# Patient Record
Sex: FEMALE | Race: WHITE | Employment: OTHER | URBAN - NONMETROPOLITAN AREA
[De-identification: names, ages, dates, MRNs, and addresses within clinical notes are randomized per-mention and may not be internally consistent; named-entity substitution may affect disease eponyms.]

---

## 2019-05-02 ENCOUNTER — APPOINTMENT (OUTPATIENT)
Dept: GENERAL RADIOLOGY | Age: 84
DRG: 511 | End: 2019-05-02
Payer: MEDICARE

## 2019-05-02 ENCOUNTER — HOSPITAL ENCOUNTER (INPATIENT)
Age: 84
LOS: 2 days | Discharge: HOME | DRG: 511 | End: 2019-05-04
Attending: EMERGENCY MEDICINE | Admitting: FAMILY MEDICINE
Payer: MEDICARE

## 2019-05-02 ENCOUNTER — APPOINTMENT (OUTPATIENT)
Dept: CT IMAGING | Age: 84
DRG: 511 | End: 2019-05-02
Payer: MEDICARE

## 2019-05-02 DIAGNOSIS — S62.521A CLOSED DISPLACED FRACTURE OF DISTAL PHALANX OF RIGHT THUMB, INITIAL ENCOUNTER: ICD-10-CM

## 2019-05-02 DIAGNOSIS — S02.2XXA CLOSED FRACTURE OF NASAL BONE, INITIAL ENCOUNTER: ICD-10-CM

## 2019-05-02 DIAGNOSIS — S52.501A CLOSED FRACTURE OF DISTAL END OF RIGHT RADIUS, UNSPECIFIED FRACTURE MORPHOLOGY, INITIAL ENCOUNTER: Primary | ICD-10-CM

## 2019-05-02 DIAGNOSIS — S62.515A CLOSED NONDISPLACED FRACTURE OF PROXIMAL PHALANX OF LEFT THUMB, INITIAL ENCOUNTER: ICD-10-CM

## 2019-05-02 DIAGNOSIS — S02.40DA CLOSED FRACTURE OF LEFT SIDE OF MAXILLA, INITIAL ENCOUNTER (HCC): ICD-10-CM

## 2019-05-02 PROBLEM — S62.91XA RIGHT HAND FRACTURE, CLOSED, INITIAL ENCOUNTER: Status: ACTIVE | Noted: 2019-05-02

## 2019-05-02 LAB
ALBUMIN SERPL-MCNC: 4.2 G/DL (ref 3.5–5.2)
ALP BLD-CCNC: 68 U/L (ref 35–104)
ALT SERPL-CCNC: 20 U/L (ref 5–33)
ANION GAP SERPL CALCULATED.3IONS-SCNC: 11 MMOL/L (ref 7–19)
APTT: 24.7 SEC (ref 26–36.2)
AST SERPL-CCNC: 21 U/L (ref 5–32)
BASOPHILS ABSOLUTE: 0.1 K/UL (ref 0–0.2)
BASOPHILS RELATIVE PERCENT: 1 % (ref 0–1)
BILIRUB SERPL-MCNC: <0.2 MG/DL (ref 0.2–1.2)
BUN BLDV-MCNC: 36 MG/DL (ref 8–23)
CALCIUM SERPL-MCNC: 9.6 MG/DL (ref 8.8–10.2)
CHLORIDE BLD-SCNC: 95 MMOL/L (ref 98–111)
CO2: 26 MMOL/L (ref 22–29)
CREAT SERPL-MCNC: 1 MG/DL (ref 0.5–0.9)
EOSINOPHILS ABSOLUTE: 0.1 K/UL (ref 0–0.6)
EOSINOPHILS RELATIVE PERCENT: 1.7 % (ref 0–5)
GFR NON-AFRICAN AMERICAN: 52
GLUCOSE BLD-MCNC: 99 MG/DL (ref 74–109)
HCT VFR BLD CALC: 32.2 % (ref 37–47)
HEMOGLOBIN: 10.7 G/DL (ref 12–16)
INR BLD: 1.08 (ref 0.88–1.18)
LYMPHOCYTES ABSOLUTE: 1.4 K/UL (ref 1.1–4.5)
LYMPHOCYTES RELATIVE PERCENT: 17.6 % (ref 20–40)
MCH RBC QN AUTO: 30.7 PG (ref 27–31)
MCHC RBC AUTO-ENTMCNC: 33.2 G/DL (ref 33–37)
MCV RBC AUTO: 92.5 FL (ref 81–99)
MONOCYTES ABSOLUTE: 0.8 K/UL (ref 0–0.9)
MONOCYTES RELATIVE PERCENT: 10.1 % (ref 0–10)
NEUTROPHILS ABSOLUTE: 5.6 K/UL (ref 1.5–7.5)
NEUTROPHILS RELATIVE PERCENT: 69.4 % (ref 50–65)
PDW BLD-RTO: 13.2 % (ref 11.5–14.5)
PLATELET # BLD: 246 K/UL (ref 130–400)
PMV BLD AUTO: 11.2 FL (ref 9.4–12.3)
POTASSIUM REFLEX MAGNESIUM: 4 MMOL/L (ref 3.5–5)
PROTHROMBIN TIME: 13.4 SEC (ref 12–14.6)
RBC # BLD: 3.48 M/UL (ref 4.2–5.4)
SODIUM BLD-SCNC: 132 MMOL/L (ref 136–145)
TOTAL PROTEIN: 8.1 G/DL (ref 6.6–8.7)
WBC # BLD: 8.1 K/UL (ref 4.8–10.8)

## 2019-05-02 PROCEDURE — 2W3HX1Z IMMOBILIZATION OF LEFT THUMB USING SPLINT: ICD-10-PCS | Performed by: ORTHOPAEDIC SURGERY

## 2019-05-02 PROCEDURE — 36415 COLL VENOUS BLD VENIPUNCTURE: CPT

## 2019-05-02 PROCEDURE — 99222 1ST HOSP IP/OBS MODERATE 55: CPT | Performed by: HOSPITALIST

## 2019-05-02 PROCEDURE — 72125 CT NECK SPINE W/O DYE: CPT

## 2019-05-02 PROCEDURE — 73110 X-RAY EXAM OF WRIST: CPT

## 2019-05-02 PROCEDURE — 85730 THROMBOPLASTIN TIME PARTIAL: CPT

## 2019-05-02 PROCEDURE — 71045 X-RAY EXAM CHEST 1 VIEW: CPT

## 2019-05-02 PROCEDURE — 73521 X-RAY EXAM HIPS BI 2 VIEWS: CPT

## 2019-05-02 PROCEDURE — 70450 CT HEAD/BRAIN W/O DYE: CPT

## 2019-05-02 PROCEDURE — 99285 EMERGENCY DEPT VISIT HI MDM: CPT | Performed by: EMERGENCY MEDICINE

## 2019-05-02 PROCEDURE — 6360000002 HC RX W HCPCS: Performed by: HOSPITALIST

## 2019-05-02 PROCEDURE — 6360000002 HC RX W HCPCS: Performed by: EMERGENCY MEDICINE

## 2019-05-02 PROCEDURE — 6370000000 HC RX 637 (ALT 250 FOR IP): Performed by: HOSPITALIST

## 2019-05-02 PROCEDURE — 96374 THER/PROPH/DIAG INJ IV PUSH: CPT

## 2019-05-02 PROCEDURE — 2W3CX1Z IMMOBILIZATION OF RIGHT LOWER ARM USING SPLINT: ICD-10-PCS | Performed by: ORTHOPAEDIC SURGERY

## 2019-05-02 PROCEDURE — 73560 X-RAY EXAM OF KNEE 1 OR 2: CPT

## 2019-05-02 PROCEDURE — 73090 X-RAY EXAM OF FOREARM: CPT

## 2019-05-02 PROCEDURE — 73100 X-RAY EXAM OF WRIST: CPT

## 2019-05-02 PROCEDURE — 70486 CT MAXILLOFACIAL W/O DYE: CPT

## 2019-05-02 PROCEDURE — 96376 TX/PRO/DX INJ SAME DRUG ADON: CPT

## 2019-05-02 PROCEDURE — 93005 ELECTROCARDIOGRAM TRACING: CPT

## 2019-05-02 PROCEDURE — 85025 COMPLETE CBC W/AUTO DIFF WBC: CPT

## 2019-05-02 PROCEDURE — 73140 X-RAY EXAM OF FINGER(S): CPT

## 2019-05-02 PROCEDURE — 99285 EMERGENCY DEPT VISIT HI MDM: CPT

## 2019-05-02 PROCEDURE — 6360000002 HC RX W HCPCS: Performed by: NURSE PRACTITIONER

## 2019-05-02 PROCEDURE — 73130 X-RAY EXAM OF HAND: CPT

## 2019-05-02 PROCEDURE — 73590 X-RAY EXAM OF LOWER LEG: CPT

## 2019-05-02 PROCEDURE — 72131 CT LUMBAR SPINE W/O DYE: CPT

## 2019-05-02 PROCEDURE — 85610 PROTHROMBIN TIME: CPT

## 2019-05-02 PROCEDURE — 1210000000 HC MED SURG R&B

## 2019-05-02 PROCEDURE — 2580000003 HC RX 258: Performed by: HOSPITALIST

## 2019-05-02 PROCEDURE — 80053 COMPREHEN METABOLIC PANEL: CPT

## 2019-05-02 RX ORDER — FAMOTIDINE 20 MG/1
20 TABLET, FILM COATED ORAL 2 TIMES DAILY
Status: DISCONTINUED | OUTPATIENT
Start: 2019-05-02 | End: 2019-05-04 | Stop reason: HOSPADM

## 2019-05-02 RX ORDER — ONDANSETRON 2 MG/ML
4 INJECTION INTRAMUSCULAR; INTRAVENOUS EVERY 6 HOURS PRN
Status: DISCONTINUED | OUTPATIENT
Start: 2019-05-02 | End: 2019-05-04 | Stop reason: HOSPADM

## 2019-05-02 RX ORDER — ASPIRIN 81 MG/1
81 TABLET ORAL DAILY
Status: ON HOLD | COMMUNITY
End: 2019-12-19 | Stop reason: HOSPADM

## 2019-05-02 RX ORDER — PREGABALIN 50 MG/1
100 CAPSULE ORAL 2 TIMES DAILY
Status: DISCONTINUED | OUTPATIENT
Start: 2019-05-02 | End: 2019-05-04 | Stop reason: HOSPADM

## 2019-05-02 RX ORDER — PREGABALIN 50 MG/1
50 CAPSULE ORAL 2 TIMES DAILY
COMMUNITY

## 2019-05-02 RX ORDER — MORPHINE SULFATE 4 MG/ML
4 INJECTION, SOLUTION INTRAMUSCULAR; INTRAVENOUS ONCE
Status: COMPLETED | OUTPATIENT
Start: 2019-05-02 | End: 2019-05-02

## 2019-05-02 RX ORDER — POTASSIUM CHLORIDE 7.45 MG/ML
10 INJECTION INTRAVENOUS PRN
Status: DISCONTINUED | OUTPATIENT
Start: 2019-05-02 | End: 2019-05-04 | Stop reason: HOSPADM

## 2019-05-02 RX ORDER — SODIUM CHLORIDE 9 MG/ML
INJECTION, SOLUTION INTRAVENOUS CONTINUOUS
Status: DISCONTINUED | OUTPATIENT
Start: 2019-05-02 | End: 2019-05-04 | Stop reason: HOSPADM

## 2019-05-02 RX ORDER — ACETAMINOPHEN 325 MG/1
650 TABLET ORAL EVERY 4 HOURS PRN
Status: DISCONTINUED | OUTPATIENT
Start: 2019-05-02 | End: 2019-05-04 | Stop reason: HOSPADM

## 2019-05-02 RX ORDER — MORPHINE SULFATE 2 MG/ML
2 INJECTION, SOLUTION INTRAMUSCULAR; INTRAVENOUS EVERY 4 HOURS PRN
Status: DISCONTINUED | OUTPATIENT
Start: 2019-05-02 | End: 2019-05-03

## 2019-05-02 RX ORDER — MAGNESIUM SULFATE 1 G/100ML
1 INJECTION INTRAVENOUS PRN
Status: DISCONTINUED | OUTPATIENT
Start: 2019-05-02 | End: 2019-05-04 | Stop reason: HOSPADM

## 2019-05-02 RX ORDER — HYDRALAZINE HYDROCHLORIDE 25 MG/1
25 TABLET, FILM COATED ORAL 3 TIMES DAILY
Status: ON HOLD | COMMUNITY
End: 2019-12-19 | Stop reason: HOSPADM

## 2019-05-02 RX ORDER — OMEPRAZOLE 40 MG/1
40 CAPSULE, DELAYED RELEASE ORAL DAILY
COMMUNITY

## 2019-05-02 RX ORDER — ONDANSETRON 2 MG/ML
4 INJECTION INTRAMUSCULAR; INTRAVENOUS ONCE
Status: COMPLETED | OUTPATIENT
Start: 2019-05-02 | End: 2019-05-02

## 2019-05-02 RX ORDER — POTASSIUM CHLORIDE 20 MEQ/1
40 TABLET, EXTENDED RELEASE ORAL PRN
Status: DISCONTINUED | OUTPATIENT
Start: 2019-05-02 | End: 2019-05-04 | Stop reason: HOSPADM

## 2019-05-02 RX ORDER — SODIUM CHLORIDE 0.9 % (FLUSH) 0.9 %
10 SYRINGE (ML) INJECTION EVERY 12 HOURS SCHEDULED
Status: DISCONTINUED | OUTPATIENT
Start: 2019-05-02 | End: 2019-05-04 | Stop reason: HOSPADM

## 2019-05-02 RX ORDER — SODIUM CHLORIDE 0.9 % (FLUSH) 0.9 %
10 SYRINGE (ML) INJECTION PRN
Status: DISCONTINUED | OUTPATIENT
Start: 2019-05-02 | End: 2019-05-04 | Stop reason: HOSPADM

## 2019-05-02 RX ORDER — VALSARTAN AND HYDROCHLOROTHIAZIDE 320; 25 MG/1; MG/1
1 TABLET, FILM COATED ORAL DAILY
COMMUNITY

## 2019-05-02 RX ADMIN — MORPHINE SULFATE 4 MG: 4 INJECTION INTRAVENOUS at 19:00

## 2019-05-02 RX ADMIN — MORPHINE SULFATE 4 MG: 4 INJECTION INTRAVENOUS at 17:05

## 2019-05-02 RX ADMIN — SODIUM CHLORIDE: 9 INJECTION, SOLUTION INTRAVENOUS at 23:10

## 2019-05-02 RX ADMIN — ONDANSETRON 4 MG: 2 INJECTION INTRAMUSCULAR; INTRAVENOUS at 17:05

## 2019-05-02 RX ADMIN — MORPHINE SULFATE 2 MG: 2 INJECTION, SOLUTION INTRAMUSCULAR; INTRAVENOUS at 23:10

## 2019-05-02 RX ADMIN — PREGABALIN 100 MG: 50 CAPSULE ORAL at 23:10

## 2019-05-02 ASSESSMENT — ENCOUNTER SYMPTOMS
SHORTNESS OF BREATH: 0
BACK PAIN: 1
WHEEZING: 0
PHOTOPHOBIA: 0
EYE ITCHING: 0
CHEST TIGHTNESS: 0
SORE THROAT: 0
STRIDOR: 0
BLOOD IN STOOL: 0
CONSTIPATION: 0
ABDOMINAL DISTENTION: 0
ALLERGIC/IMMUNOLOGIC NEGATIVE: 1
TROUBLE SWALLOWING: 0
SINUS PAIN: 0
EYE DISCHARGE: 0
VOMITING: 0
DIARRHEA: 0
EYE REDNESS: 0
COLOR CHANGE: 0
EYE PAIN: 0
NAUSEA: 0
ABDOMINAL PAIN: 0

## 2019-05-02 ASSESSMENT — PAIN SCALES - GENERAL
PAINLEVEL_OUTOF10: 0
PAINLEVEL_OUTOF10: 6
PAINLEVEL_OUTOF10: 7
PAINLEVEL_OUTOF10: 6
PAINLEVEL_OUTOF10: 6

## 2019-05-02 ASSESSMENT — PAIN DESCRIPTION - PAIN TYPE: TYPE: ACUTE PAIN

## 2019-05-02 ASSESSMENT — PAIN DESCRIPTION - LOCATION: LOCATION: FACE;WRIST

## 2019-05-02 NOTE — ED PROVIDER NOTES
Attending Supervisory Note/Shared Visit   I have personally performed a face to face diagnostic evaluation on this patient. I have reviewed the mid-levels findings and agree. EKG: Sinus rhythm. Right bundle. Prolonged OH interval.    Briefly, patient states that her female presents after mechanical fall. Was walking out of a store and tripped falling on outstretched right arm. Also struck her face. Has left facial swelling. Also has obvious deformity to right wrist with obvious fracture. No LOC. A little sore all over. Has a skin tear to left thumb. Also has some pain to left thumb and right thumb. Mild low back pain. Mild pain left knee. On exam she is in no distress. Has some swelling to the left maxillary region. No evidence of septal hematoma. Some mild pain in her L-spine. No focal tenderness or step-off. No pain in the T-spine or C-spine. Pelvis stable. No pain in her legs except for some mild pain in left knee. Has obvious deformity to the right wrist. Neurovascular intact. Some mild pain in both thumbs. Workup revealed left nasal fracture, left maxillary fracture. No septal hematoma. No nosebleed. Spoke with Dr. Shirley Luna, ENT on call at [facility], who said the patient can follow up as an outpatient concerning her facial fractures. Has fractures of both thumbs as well as displaced distal radius fracture. Dr. Mynor Zuñiga was consulted and saw the patient in the ER. He splinted her wrist. His plan is to take her to the OR tomorrow for definitive treatment of her wrist deformity. Case discussed with hospitalist who will admit. Patient resting comfortably at this time. FINAL IMPRESSION      1. Closed fracture of distal end of right radius, unspecified fracture morphology, initial encounter    2. Closed fracture of left side of maxilla, initial encounter ([facility])    3. Closed fracture of nasal bone, initial encounter    4. Closed displaced fracture of distal phalanx of right thumb, initial encounter    5. Closed nondisplaced fracture of proximal phalanx of left thumb, initial encounter          Bonita Talley MD  Attending Emergency Physician      Bonita Talley MD  05/06/19 1069

## 2019-05-02 NOTE — ED PROVIDER NOTES
[facility] 5 SURG SERVICES  eMERGENCYdEPARTMENT eNCOUnter      Pt Name: Liliana Carballo  MRN: [id number]  [place] 6/18/1931  Date of evaluation: 5/2/2019  SLOAN [place] - KEITH    CHIEF COMPLAINT       Chief Complaint   Patient presents with   Sebastáin Erickson     arrived via EMS fell coming out of store, injured right wrist and hit face on pavement         HISTORY OF PRESENT ILLNESS  (Location/Symptom, Timing/Onset, Context/Setting, Quality, Duration, Modifying Factors, Severity.)   Liliana Carballo is a 80 y.o. female who presents to the emergency department via EMS in regards to a fall last in the past hour. Patient says she was walking outside of [facility] and tripped on the curb which caused the fall. She said she felt himself falling so she extended her right arm to try to catch herself she landed on the right side and then her face the concrete. She complains of pain in the right wrist has an obvious deformity, complains of facial pain and has obvious swelling to the left side of the face, she has a skin tear to the left thumb, and she also complains of low back pain. She complains of a dull headache. Denies LOC and denies hitting head. She takes a 81 mg aspirin daily but denies any other anticoagulants. She denies any chest pain or shortness of air. Since she had her last tetanus 2 weeks ago. Patient has an [address] wrapped around the left lower extremity- she says she sees Wound Care in [place] for this- reports the wound started from a fall at her home. The history is provided by the patient and a relative. Nursing Notes were reviewed and I agree. REVIEW OF SYSTEMS    (2-9 systems for level 4, 10 or more for level 5)     Review of Systems   Constitutional: Negative for activity change, appetite change, chills, diaphoresis, fatigue, fever and unexpected weight change. HENT: Negative for ear pain, sinus pain, sore throat and trouble swallowing.          Bilateral facial pain worse on the left   Eyes: Negative for photophobia, pain, discharge, redness and itching. Respiratory: Negative for chest tightness, shortness of breath, wheezing and stridor. Cardiovascular: Negative for chest pain and leg swelling. Gastrointestinal: Negative for abdominal distention, abdominal pain, blood in stool, constipation, diarrhea, nausea and vomiting. Endocrine: Negative. Genitourinary: Negative for difficulty urinating and dysuria. Musculoskeletal: Positive for back pain (Low back pain), joint swelling (Right wrist deformity) and neck stiffness. Negative for neck pain. Skin: Positive for wound (Skin tear noted to the left thumb, ashwin boot present to the left lower extremity). Negative for color change, pallor and rash. Allergic/Immunologic: Negative. Neurological: Positive for headaches. Negative for dizziness. Hematological: Negative. Psychiatric/Behavioral: Negative. Except as noted above the remainder of the review of systems was reviewed and negative.        PAST MEDICAL HISTORY     Past Medical History:   Diagnosis Date    Hypertension          SURGICAL HISTORY       Past Surgical History:   Procedure Laterality Date    FINGER SURGERY Right 5/3/2019    PERCUTANEOUS PINNING RIGHT THUMB DISTAL PHALANX FRACTURE performed by Venice Negron MD at [address], PARTIAL Left     TONSILLECTOMY      WRIST FRACTURE SURGERY Right 5/3/2019    OPEN REDUCTION INTERNAL FIXATION RIGHT DISTAL RADIUS performed by Venice Negron MD at [address] [facility]       Discharge Medication List as of 5/4/2019  5:35 PM      CONTINUE these medications which have NOT CHANGED    Details   aspirin 81 MG EC tablet Take 81 mg by mouth dailyHistorical Med      valsartan-hydrochlorothiazide (DIOVAN-HCT) 320-25 MG per tablet Take 1 tablet by mouth dailyHistorical Med      omeprazole (PRILOSEC) 40 MG delayed release capsule Take 40 mg by mouth dailyHistorical Med      hydrALAZINE deformities   LLE: slight edema to the left knee, range of motion normal but pain elicited with range of motion     Skin: Skin is dry. Capillary refill takes less than 2 seconds. No rash noted. She is not diaphoretic. No erythema. No pallor. Psychiatric: She has a normal mood and affect. Her behavior is normal.   Nursing note and vitals reviewed. DIAGNOSTIC RESULTS     RADIOLOGY:   Non-plain film images such as CT, Ultrasound and MRI are read by the radiologist. Plain radiographic images are visualized and preliminarilyinterpreted by No att. providers found with the below findings:        Interpretation per the Radiologist below, if available at the time of this note:    XR WRIST RIGHT (MIN 3 VIEWS)   Final Result      XR FINGER RIGHT (MIN 2 VIEWS)   Final Result      XR WRIST RIGHT (MIN 3 VIEWS)   Final Result      CT WRIST RIGHT WO CONTRAST   Final Result   1. Severely comminuted and displaced fracture of the distal radius. A   large palmar fragment is impacted proximally at least 7 mm and the   carpal bones are associated with this fragment showing proximal   displacement. The larger dorsal component of the distal radius   overrides the carpal bones by almost 1.5 cm at its greatest point. Signed by Dr Lelia Jones on 5/3/2019 2:05 PM      XR FINGER LEFT (MIN 2 VIEWS)   Final Result   3 views of the left thumb were obtained. There is an acute,   nondisplaced fracture through the base of the 1st proximal phalanx. No   articular disruption is identified. Severe degenerative changes are   present in the 1st carpometacarpal joint. Signed by Dr Fran Chery on 5/2/2019 8:29 PM      XR WRIST RIGHT (2 VIEWS)   Final Result   2 views of the right wrist demonstrate no significant improvement in   the severely displaced fracture through the distal radial metaphysis   and volar displacement by approximately one shaft width.  There is   questionable ulnocarpal dislocation that was not appreciated on the previous exam.   Signed by Dr Eric Diaz on 5/2/2019 8:30 PM      XR CHEST PORTABLE   Final Result   1. No radiographic evidence of acute cardiopulmonary process. Signed by Dr Eric Diaz on 5/2/2019 7:03 PM      XR HIP BILATERAL W AP PELVIS (2 VIEWS)   Final Result   1. Moderate to marked degenerative changes in the hips. No fracture. Signed by Dr Eric Diaz on 5/2/2019 7:04 PM      XR TIBIA FIBULA LEFT (2 VIEWS)   Final Result   1. No acute bony abnormality in the left knee or lower leg. 2. Moderate degenerative changes in the left knee joint. Signed by Dr Eric Diaz on 5/2/2019 7:06 PM      XR KNEE LEFT (1-2 VIEWS)   Final Result   1. No acute bony abnormality in the left knee or lower leg. 2. Moderate degenerative changes in the left knee joint. Signed by Dr Eric Diaz on 5/2/2019 7:06 PM      XR WRIST RIGHT (MIN 3 VIEWS)   Final Result   1. Markedly displaced fracture through the distal right radial   metaphysis with intra-articular components and approximately 2.4 cm of   the lumbar displacement. 2. Acute, intra-articular, comminuted fracture through the base of the   1st distal phalanx. 3. Moderate degenerative changes in the hand and wrist.   Signed by Dr Eric Diaz on 5/2/2019 5:33 PM      XR RADIUS ULNA RIGHT (2 VIEWS)   Final Result   1. Markedly displaced fracture through the distal right radial   metaphysis with intra-articular components and approximately 2.4 cm of   the lumbar displacement. 2. Acute, intra-articular, comminuted fracture through the base of the   1st distal phalanx. 3. Moderate degenerative changes in the hand and wrist.   Signed by Dr Eric Daiz on 5/2/2019 5:33 PM      XR HAND RIGHT (MIN 3 VIEWS)   Final Result   1. Markedly displaced fracture through the distal right radial   metaphysis with intra-articular components and approximately 2.4 cm of   the lumbar displacement.    2. Acute, intra-articular, comminuted fracture through the base of the   1st distal phalanx. 3. Moderate degenerative changes in the hand and wrist.   Signed by Dr Namita Kwan on 5/2/2019 5:33 PM      CT Facial Bones WO Contrast   Final Result   1. Acute, comminuted and depressed fracture through the anterior wall   of the left maxillary sinus with associated hemorrhage in the left   maxillary sinus. 2. Minimally displaced, acute fracture through the left nasal bone. Signed by Dr Namita Kwan on 5/2/2019 5:38 PM      CT Lumbar Spine WO Contrast   Final Result   1. Multilevel degenerative changes in the lumbar spine without   evidence of acute fracture or subluxation. Signed by Dr Namita Kwan on 5/2/2019 5:45 PM      CT Cervical Spine WO Contrast   Final Result   1. Degenerative changes in the cervical spine without evidence of   acute osseous injury. Signed by Dr Namita Kwan on 5/2/2019 5:42 PM      CT Head WO Contrast   Final Result   1. Mild cerebral and cerebellar volume loss with chronic microvascular   disease but no evidence of acute intracranial process. 2. Air-fluid level in the left maxillary sinus would be described in   the facial bone CT.    Signed by Dr Namita Kwan on 5/2/2019 5:36 PM      FLUORO FOR SURGICAL PROCEDURES    (Results Pending)   FLUORO FOR SURGICAL PROCEDURES    (Results Pending)       LABS:  Labs Reviewed   CBC WITH AUTO DIFFERENTIAL - Abnormal; Notable for the following components:       Result Value    RBC 3.48 (*)     Hemoglobin 10.7 (*)     Hematocrit 32.2 (*)     Neutrophils % 69.4 (*)     Lymphocytes % 17.6 (*)     Monocytes % 10.1 (*)     All other components within normal limits   COMPREHENSIVE METABOLIC PANEL W/ REFLEX TO MG FOR LOW K - Abnormal; Notable for the following components:    Sodium 132 (*)     Chloride 95 (*)     BUN 36 (*)     CREATININE 1.0 (*)     GFR Non- 52 (*)     All other components within normal limits   APTT - Abnormal; Notable for the following components:    aPTT 24.7 (*)     All other components within normal limits   BASIC METABOLIC PANEL W/ REFLEX TO MG FOR LOW K - Abnormal; Notable for the following components:    Sodium 131 (*)     Chloride 96 (*)     Glucose 126 (*)     BUN 34 (*)     GFR Non- 59 (*)     All other components within normal limits   CBC WITH AUTO DIFFERENTIAL - Abnormal; Notable for the following components:    RBC 3.10 (*)     Hemoglobin 9.7 (*)     Hematocrit 29.1 (*)     MCH 31.3 (*)     Neutrophils % 77.4 (*)     Lymphocytes % 11.5 (*)     Lymphocytes # 1.0 (*)     All other components within normal limits   BASIC METABOLIC PANEL W/ REFLEX TO MG FOR LOW K - Abnormal; Notable for the following components:    Sodium 132 (*)     Chloride 96 (*)     Glucose 113 (*)     BUN 27 (*)     Calcium 8.4 (*)     All other components within normal limits   CBC WITH AUTO DIFFERENTIAL - Abnormal; Notable for the following components:    WBC 13.6 (*)     RBC 2.81 (*)     Hemoglobin 8.7 (*)     Hematocrit 26.2 (*)     Neutrophils % 74.0 (*)     Lymphocytes % 17.0 (*)     Neutrophils # 10.3 (*)     Monocytes # 1.00 (*)     All other components within normal limits   PROTIME-INR       All other labs were within normal range or notreturned as of this dictation. RE-ASSESSMENT      Patient remained stable throughout ED course. Patient had no neurovascular compromise to the right distal upper extremity before or after the splint application. Patient had normal cap refill, sensation, ability to move all fingers, and no thumb/finger opposition.      EMERGENCY DEPARTMENT COURSE and DIFFERENTIAL DIAGNOSIS/MDM:   Vitals:    Vitals:    05/04/19 0621 05/04/19 0715 05/04/19 1101 05/04/19 1536   BP: (!) 141/62  (!) 133/54 (!) 128/55   Pulse: 85 81 77 74   Resp: 16  16 14   Temp: 98.3 °F (36.8 °C)  98.3 °F (36.8 °C) 96.1 °F (35.6 °C)   TempSrc: Temporal  Temporal Temporal   SpO2: 96%  98% 96%   Weight:       Height:               MDM  Number of Diagnoses or Management Options  Closed displaced fracture of distal phalanx of right thumb, initial encounter:   Closed fracture of left side of maxilla, initial encounter [facility]):   Closed fracture of nasal bone, initial encounter:   Closed nondisplaced fracture of proximal phalanx of left thumb, initial encounter:   Diagnosis management comments: Patient presents emergency department for evaluation after a fall. She had an obvious deformity to the right wrist was able to move all fingers with normal cap Refill and sensation in the fingers. She also had swelling and pain of the left face. Patient had complained of dull headache as well as low back pain. Dr. Izaiah Luna was consulted on this patient also examined patient. Workup was initiated including a CT of the head, CT of the facial bones, C-spine, CT of the L-spine, right forearm wrist and hand, bilateral hip and pelvis, and left knee and left tib-fib. These images showed acute, comminuted and depressed fracture through the anterior wall of the left maxillary sinus with associated hemorrhage in the left maxillary sinus with a non-displaced left nasal fx. Dr. Izaiah Luna discussed these findings with Dr. Martina Sheth, ENT, who says the patient can follow up outpatient for this. Xray of the right hand showed markedly displaced fracture through the base of the proximal phalanx. Dr. Izaiah Luna consulted Dr. Ioana Billy, orthopedics. Dr. Ioana Billy examined the patient in the ED. I applied traction to the right wrist by pulling through the fingers and applying pressure above the left elbow at 90 degrees. Dr. Ioana Billy applied a plaster cast. This procedure lasted approximately 30 minutes. Both before and after cast application patient was noted to have normal cap refill, sensation, movement of all five fingers, and no thumb/finger opposition. There were no signs of decreased circulation present. Patient tolerated procedure well as she had been medication prior to procedure.       An xray was then obtained of the left thumb which showed non-displaced fracture. An aluminum splint was applied. The skin tear was also dressed with antibiotic cream and a bandage was applied to the area. I contacted the hospitalist for admission of pain control and for surgery tomorrow afternoon by Dr. Licha Romo. Patient was admitted and family updated. Patient was stable throughout ED course and was stable at transfer of care from the ED to the nursing unit. PROCEDURES:    Procedures      FINAL IMPRESSION      1. Closed fracture of distal end of right radius, unspecified fracture morphology, initial encounter    2. Closed fracture of left side of maxilla, initial encounter ([facility])    3. Closed fracture of nasal bone, initial encounter    4. Closed displaced fracture of distal phalanx of right thumb, initial encounter    5. Closed nondisplaced fracture of proximal phalanx of left thumb, initial encounter          DISPOSITION/PLAN   DISPOSITION Admitted 05/02/2019 08:40:20 PM      PATIENT REFERRED TO:  Erica Nagel MD  [id number]  [place] [phone]    On 5/10/2019  at Berta Valadez MD  [address]  [place] [phone]    In 2 weeks        DISCHARGE MEDICATIONS:  Discharge Medication List as of 5/4/2019  5:35 PM      START taking these medications    Details   oxyCODONE-acetaminophen (PERCOCET) 5-325 MG per tablet Take 1 tablet by mouth every 4 hours as needed for Pain for up to 25 doses. , Disp-25 tablet, R-0Print             (Please note that portions of this note were completed with a voice recognition program.  Efforts were made to edit the dictations but occasionallywords are mis-transcribed.)    SLOAN Jenkins - SLOAN Alfaro CNP  05/02/19 [facility], APRN - CNP  05/08/19 [id number]

## 2019-05-03 ENCOUNTER — APPOINTMENT (OUTPATIENT)
Dept: GENERAL RADIOLOGY | Age: 84
DRG: 511 | End: 2019-05-03
Payer: MEDICARE

## 2019-05-03 ENCOUNTER — ANESTHESIA EVENT (OUTPATIENT)
Dept: OPERATING ROOM | Age: 84
DRG: 511 | End: 2019-05-03
Payer: MEDICARE

## 2019-05-03 ENCOUNTER — APPOINTMENT (OUTPATIENT)
Dept: CT IMAGING | Age: 84
DRG: 511 | End: 2019-05-03
Payer: MEDICARE

## 2019-05-03 ENCOUNTER — ANESTHESIA (OUTPATIENT)
Dept: OPERATING ROOM | Age: 84
DRG: 511 | End: 2019-05-03
Payer: MEDICARE

## 2019-05-03 VITALS
OXYGEN SATURATION: 100 % | SYSTOLIC BLOOD PRESSURE: 152 MMHG | TEMPERATURE: 98.1 F | DIASTOLIC BLOOD PRESSURE: 53 MMHG | RESPIRATION RATE: 17 BRPM

## 2019-05-03 PROBLEM — S52.501A CLOSED FRACTURE OF RIGHT DISTAL RADIUS: Status: ACTIVE | Noted: 2019-05-03

## 2019-05-03 PROBLEM — I10 HYPERTENSION: Status: ACTIVE | Noted: 2019-05-03

## 2019-05-03 PROBLEM — S81.802A OPEN WOUND OF LEFT LOWER LEG: Chronic | Status: ACTIVE | Noted: 2019-05-03

## 2019-05-03 PROBLEM — S62.515A CLOSED NONDISPLACED FRACTURE OF PROXIMAL PHALANX OF LEFT THUMB: Status: ACTIVE | Noted: 2019-05-03

## 2019-05-03 LAB
ANION GAP SERPL CALCULATED.3IONS-SCNC: 11 MMOL/L (ref 7–19)
BASOPHILS ABSOLUTE: 0.1 K/UL (ref 0–0.2)
BASOPHILS RELATIVE PERCENT: 0.7 % (ref 0–1)
BUN BLDV-MCNC: 34 MG/DL (ref 8–23)
CALCIUM SERPL-MCNC: 8.8 MG/DL (ref 8.8–10.2)
CHLORIDE BLD-SCNC: 96 MMOL/L (ref 98–111)
CO2: 24 MMOL/L (ref 22–29)
CREAT SERPL-MCNC: 0.9 MG/DL (ref 0.5–0.9)
EOSINOPHILS ABSOLUTE: 0 K/UL (ref 0–0.6)
EOSINOPHILS RELATIVE PERCENT: 0.4 % (ref 0–5)
GFR NON-AFRICAN AMERICAN: 59
GLUCOSE BLD-MCNC: 126 MG/DL (ref 74–109)
HCT VFR BLD CALC: 29.1 % (ref 37–47)
HEMOGLOBIN: 9.7 G/DL (ref 12–16)
LYMPHOCYTES ABSOLUTE: 1 K/UL (ref 1.1–4.5)
LYMPHOCYTES RELATIVE PERCENT: 11.5 % (ref 20–40)
MCH RBC QN AUTO: 31.3 PG (ref 27–31)
MCHC RBC AUTO-ENTMCNC: 33.3 G/DL (ref 33–37)
MCV RBC AUTO: 93.9 FL (ref 81–99)
MONOCYTES ABSOLUTE: 0.8 K/UL (ref 0–0.9)
MONOCYTES RELATIVE PERCENT: 9.6 % (ref 0–10)
NEUTROPHILS ABSOLUTE: 6.6 K/UL (ref 1.5–7.5)
NEUTROPHILS RELATIVE PERCENT: 77.4 % (ref 50–65)
PDW BLD-RTO: 13.2 % (ref 11.5–14.5)
PLATELET # BLD: 233 K/UL (ref 130–400)
PMV BLD AUTO: 11.6 FL (ref 9.4–12.3)
POTASSIUM REFLEX MAGNESIUM: 3.9 MMOL/L (ref 3.5–5)
RBC # BLD: 3.1 M/UL (ref 4.2–5.4)
SODIUM BLD-SCNC: 131 MMOL/L (ref 136–145)
WBC # BLD: 8.5 K/UL (ref 4.8–10.8)

## 2019-05-03 PROCEDURE — 3209999900 FLUORO FOR SURGICAL PROCEDURES

## 2019-05-03 PROCEDURE — 73140 X-RAY EXAM OF FINGER(S): CPT

## 2019-05-03 PROCEDURE — 0PSH04Z REPOSITION RIGHT RADIUS WITH INTERNAL FIXATION DEVICE, OPEN APPROACH: ICD-10-PCS | Performed by: ORTHOPAEDIC SURGERY

## 2019-05-03 PROCEDURE — 7100000000 HC PACU RECOVERY - FIRST 15 MIN: Performed by: ORTHOPAEDIC SURGERY

## 2019-05-03 PROCEDURE — C1713 ANCHOR/SCREW BN/BN,TIS/BN: HCPCS | Performed by: ORTHOPAEDIC SURGERY

## 2019-05-03 PROCEDURE — 2709999900 HC NON-CHARGEABLE SUPPLY: Performed by: ORTHOPAEDIC SURGERY

## 2019-05-03 PROCEDURE — 6360000002 HC RX W HCPCS

## 2019-05-03 PROCEDURE — 2580000003 HC RX 258: Performed by: ANESTHESIOLOGY

## 2019-05-03 PROCEDURE — 3E0T3BZ INTRODUCTION OF ANESTHETIC AGENT INTO PERIPHERAL NERVES AND PLEXI, PERCUTANEOUS APPROACH: ICD-10-PCS | Performed by: ANESTHESIOLOGY

## 2019-05-03 PROCEDURE — 6360000002 HC RX W HCPCS: Performed by: HOSPITALIST

## 2019-05-03 PROCEDURE — 85025 COMPLETE CBC W/AUTO DIFF WBC: CPT

## 2019-05-03 PROCEDURE — 6370000000 HC RX 637 (ALT 250 FOR IP): Performed by: HOSPITALIST

## 2019-05-03 PROCEDURE — 73200 CT UPPER EXTREMITY W/O DYE: CPT

## 2019-05-03 PROCEDURE — 64415 NJX AA&/STRD BRCH PLXS IMG: CPT | Performed by: NURSE ANESTHETIST, CERTIFIED REGISTERED

## 2019-05-03 PROCEDURE — 36415 COLL VENOUS BLD VENIPUNCTURE: CPT

## 2019-05-03 PROCEDURE — 73110 X-RAY EXAM OF WRIST: CPT

## 2019-05-03 PROCEDURE — 3600000004 HC SURGERY LEVEL 4 BASE: Performed by: ORTHOPAEDIC SURGERY

## 2019-05-03 PROCEDURE — 80048 BASIC METABOLIC PNL TOTAL CA: CPT

## 2019-05-03 PROCEDURE — 0PSR34Z REPOSITION RIGHT THUMB PHALANX WITH INTERNAL FIXATION DEVICE, PERCUTANEOUS APPROACH: ICD-10-PCS | Performed by: ORTHOPAEDIC SURGERY

## 2019-05-03 PROCEDURE — 2500000003 HC RX 250 WO HCPCS: Performed by: NURSE ANESTHETIST, CERTIFIED REGISTERED

## 2019-05-03 PROCEDURE — 3600000014 HC SURGERY LEVEL 4 ADDTL 15MIN: Performed by: ORTHOPAEDIC SURGERY

## 2019-05-03 PROCEDURE — 7100000001 HC PACU RECOVERY - ADDTL 15 MIN: Performed by: ORTHOPAEDIC SURGERY

## 2019-05-03 PROCEDURE — 3700000000 HC ANESTHESIA ATTENDED CARE: Performed by: ORTHOPAEDIC SURGERY

## 2019-05-03 PROCEDURE — 3700000001 HC ADD 15 MINUTES (ANESTHESIA): Performed by: ORTHOPAEDIC SURGERY

## 2019-05-03 PROCEDURE — 99232 SBSQ HOSP IP/OBS MODERATE 35: CPT | Performed by: FAMILY MEDICINE

## 2019-05-03 PROCEDURE — APPSS30 APP SPLIT SHARED TIME 16-30 MINUTES: Performed by: PHYSICIAN ASSISTANT

## 2019-05-03 PROCEDURE — 1210000000 HC MED SURG R&B

## 2019-05-03 PROCEDURE — 2720000010 HC SURG SUPPLY STERILE: Performed by: ORTHOPAEDIC SURGERY

## 2019-05-03 PROCEDURE — 6360000002 HC RX W HCPCS: Performed by: NURSE ANESTHETIST, CERTIFIED REGISTERED

## 2019-05-03 PROCEDURE — 6360000002 HC RX W HCPCS: Performed by: ANESTHESIOLOGY

## 2019-05-03 DEVICE — PLATE BNE L45MM 6X2 H R VOLAR DST RAD S STL VAR ANG LOK: Type: IMPLANTABLE DEVICE | Site: THUMB | Status: FUNCTIONAL

## 2019-05-03 DEVICE — SCREW BNE L16MM DIA2.4MM DST RAD VOLAR S STL ST VAR ANG LOK: Type: IMPLANTABLE DEVICE | Site: WRIST | Status: FUNCTIONAL

## 2019-05-03 DEVICE — SCREW BNE L18MM DIA2.4MM DST RAD VOLAR S STL ST VAR ANG LOK: Type: IMPLANTABLE DEVICE | Site: WRIST | Status: FUNCTIONAL

## 2019-05-03 DEVICE — K-WIRE DIAMOND POINT BOTH ENDS                                    .045 X 9: Type: IMPLANTABLE DEVICE | Site: THUMB | Status: FUNCTIONAL

## 2019-05-03 DEVICE — PLATE BNE W22XL54MM STD 9 H R DST RAD VOLAR S STL VAR ANG: Type: IMPLANTABLE DEVICE | Site: WRIST | Status: FUNCTIONAL

## 2019-05-03 DEVICE — SCREW BNE L14MM DIA2.4MM CORT S STL ST T8 STARDRV RECESS: Type: IMPLANTABLE DEVICE | Site: WRIST | Status: FUNCTIONAL

## 2019-05-03 DEVICE — SCREW BNE L12MM DIA2.4MM DST RAD VOLAR S STL ST VAR ANG LOK: Type: IMPLANTABLE DEVICE | Site: WRIST | Status: FUNCTIONAL

## 2019-05-03 RX ORDER — CEFAZOLIN SODIUM 1 G/3ML
INJECTION, POWDER, FOR SOLUTION INTRAMUSCULAR; INTRAVENOUS PRN
Status: DISCONTINUED | OUTPATIENT
Start: 2019-05-03 | End: 2019-05-03 | Stop reason: SDUPTHER

## 2019-05-03 RX ORDER — ONDANSETRON 2 MG/ML
INJECTION INTRAMUSCULAR; INTRAVENOUS PRN
Status: DISCONTINUED | OUTPATIENT
Start: 2019-05-03 | End: 2019-05-03 | Stop reason: SDUPTHER

## 2019-05-03 RX ORDER — PROPOFOL 10 MG/ML
INJECTION, EMULSION INTRAVENOUS PRN
Status: DISCONTINUED | OUTPATIENT
Start: 2019-05-03 | End: 2019-05-03 | Stop reason: SDUPTHER

## 2019-05-03 RX ORDER — EPHEDRINE SULFATE 50 MG/ML
INJECTION, SOLUTION INTRAVENOUS PRN
Status: DISCONTINUED | OUTPATIENT
Start: 2019-05-03 | End: 2019-05-03 | Stop reason: SDUPTHER

## 2019-05-03 RX ORDER — FENTANYL CITRATE 50 UG/ML
50 INJECTION, SOLUTION INTRAMUSCULAR; INTRAVENOUS
Status: DISCONTINUED | OUTPATIENT
Start: 2019-05-03 | End: 2019-05-03 | Stop reason: HOSPADM

## 2019-05-03 RX ORDER — SODIUM CHLORIDE 0.9 % (FLUSH) 0.9 %
10 SYRINGE (ML) INJECTION EVERY 12 HOURS SCHEDULED
Status: DISCONTINUED | OUTPATIENT
Start: 2019-05-03 | End: 2019-05-03 | Stop reason: HOSPADM

## 2019-05-03 RX ORDER — HYDRALAZINE HYDROCHLORIDE 20 MG/ML
5 INJECTION INTRAMUSCULAR; INTRAVENOUS EVERY 10 MIN PRN
Status: DISCONTINUED | OUTPATIENT
Start: 2019-05-03 | End: 2019-05-03 | Stop reason: HOSPADM

## 2019-05-03 RX ORDER — MORPHINE SULFATE 2 MG/ML
4 INJECTION, SOLUTION INTRAMUSCULAR; INTRAVENOUS EVERY 5 MIN PRN
Status: DISCONTINUED | OUTPATIENT
Start: 2019-05-03 | End: 2019-05-03 | Stop reason: HOSPADM

## 2019-05-03 RX ORDER — ROPIVACAINE HYDROCHLORIDE 5 MG/ML
INJECTION, SOLUTION EPIDURAL; INFILTRATION; PERINEURAL
Status: COMPLETED | OUTPATIENT
Start: 2019-05-03 | End: 2019-05-03

## 2019-05-03 RX ORDER — SODIUM CHLORIDE, SODIUM LACTATE, POTASSIUM CHLORIDE, CALCIUM CHLORIDE 600; 310; 30; 20 MG/100ML; MG/100ML; MG/100ML; MG/100ML
INJECTION, SOLUTION INTRAVENOUS CONTINUOUS
Status: DISCONTINUED | OUTPATIENT
Start: 2019-05-03 | End: 2019-05-03

## 2019-05-03 RX ORDER — OXYCODONE HYDROCHLORIDE AND ACETAMINOPHEN 5; 325 MG/1; MG/1
1 TABLET ORAL EVERY 4 HOURS PRN
Status: DISCONTINUED | OUTPATIENT
Start: 2019-05-03 | End: 2019-05-04 | Stop reason: HOSPADM

## 2019-05-03 RX ORDER — MORPHINE SULFATE 2 MG/ML
4 INJECTION, SOLUTION INTRAMUSCULAR; INTRAVENOUS EVERY 4 HOURS PRN
Status: DISCONTINUED | OUTPATIENT
Start: 2019-05-03 | End: 2019-05-04 | Stop reason: HOSPADM

## 2019-05-03 RX ORDER — DEXAMETHASONE SODIUM PHOSPHATE 10 MG/ML
INJECTION INTRAMUSCULAR; INTRAVENOUS PRN
Status: DISCONTINUED | OUTPATIENT
Start: 2019-05-03 | End: 2019-05-03 | Stop reason: SDUPTHER

## 2019-05-03 RX ORDER — MEPERIDINE HYDROCHLORIDE 50 MG/ML
12.5 INJECTION INTRAMUSCULAR; INTRAVENOUS; SUBCUTANEOUS EVERY 5 MIN PRN
Status: DISCONTINUED | OUTPATIENT
Start: 2019-05-03 | End: 2019-05-03 | Stop reason: HOSPADM

## 2019-05-03 RX ORDER — LIDOCAINE HYDROCHLORIDE 10 MG/ML
1 INJECTION, SOLUTION EPIDURAL; INFILTRATION; INTRACAUDAL; PERINEURAL
Status: DISCONTINUED | OUTPATIENT
Start: 2019-05-03 | End: 2019-05-03 | Stop reason: HOSPADM

## 2019-05-03 RX ORDER — SODIUM CHLORIDE 9 MG/ML
INJECTION, SOLUTION INTRAVENOUS CONTINUOUS
Status: DISCONTINUED | OUTPATIENT
Start: 2019-05-03 | End: 2019-05-03

## 2019-05-03 RX ORDER — MIDAZOLAM HYDROCHLORIDE 1 MG/ML
INJECTION INTRAMUSCULAR; INTRAVENOUS
Status: COMPLETED
Start: 2019-05-03 | End: 2019-05-03

## 2019-05-03 RX ORDER — PROMETHAZINE HYDROCHLORIDE 25 MG/ML
6.25 INJECTION, SOLUTION INTRAMUSCULAR; INTRAVENOUS
Status: DISCONTINUED | OUTPATIENT
Start: 2019-05-03 | End: 2019-05-03 | Stop reason: HOSPADM

## 2019-05-03 RX ORDER — SODIUM CHLORIDE 0.9 % (FLUSH) 0.9 %
10 SYRINGE (ML) INJECTION PRN
Status: DISCONTINUED | OUTPATIENT
Start: 2019-05-03 | End: 2019-05-03 | Stop reason: HOSPADM

## 2019-05-03 RX ORDER — LABETALOL HYDROCHLORIDE 5 MG/ML
5 INJECTION, SOLUTION INTRAVENOUS EVERY 10 MIN PRN
Status: DISCONTINUED | OUTPATIENT
Start: 2019-05-03 | End: 2019-05-03 | Stop reason: HOSPADM

## 2019-05-03 RX ORDER — LIDOCAINE HYDROCHLORIDE 10 MG/ML
INJECTION, SOLUTION EPIDURAL; INFILTRATION; INTRACAUDAL; PERINEURAL PRN
Status: DISCONTINUED | OUTPATIENT
Start: 2019-05-03 | End: 2019-05-03 | Stop reason: SDUPTHER

## 2019-05-03 RX ORDER — ENALAPRILAT 2.5 MG/2ML
1.25 INJECTION INTRAVENOUS
Status: DISCONTINUED | OUTPATIENT
Start: 2019-05-03 | End: 2019-05-03 | Stop reason: HOSPADM

## 2019-05-03 RX ORDER — METOCLOPRAMIDE HYDROCHLORIDE 5 MG/ML
10 INJECTION INTRAMUSCULAR; INTRAVENOUS
Status: DISCONTINUED | OUTPATIENT
Start: 2019-05-03 | End: 2019-05-03 | Stop reason: HOSPADM

## 2019-05-03 RX ORDER — DIPHENHYDRAMINE HYDROCHLORIDE 50 MG/ML
12.5 INJECTION INTRAMUSCULAR; INTRAVENOUS
Status: DISCONTINUED | OUTPATIENT
Start: 2019-05-03 | End: 2019-05-03 | Stop reason: HOSPADM

## 2019-05-03 RX ORDER — ROCURONIUM BROMIDE 10 MG/ML
INJECTION, SOLUTION INTRAVENOUS PRN
Status: DISCONTINUED | OUTPATIENT
Start: 2019-05-03 | End: 2019-05-03 | Stop reason: SDUPTHER

## 2019-05-03 RX ORDER — CEFAZOLIN SODIUM 1 G/50ML
1 INJECTION, SOLUTION INTRAVENOUS EVERY 8 HOURS
Status: COMPLETED | OUTPATIENT
Start: 2019-05-04 | End: 2019-05-04

## 2019-05-03 RX ORDER — MIDAZOLAM HYDROCHLORIDE 1 MG/ML
2 INJECTION INTRAMUSCULAR; INTRAVENOUS
Status: DISCONTINUED | OUTPATIENT
Start: 2019-05-03 | End: 2019-05-03 | Stop reason: HOSPADM

## 2019-05-03 RX ORDER — FENTANYL CITRATE 50 UG/ML
25 INJECTION, SOLUTION INTRAMUSCULAR; INTRAVENOUS
Status: DISCONTINUED | OUTPATIENT
Start: 2019-05-03 | End: 2019-05-03 | Stop reason: HOSPADM

## 2019-05-03 RX ORDER — MORPHINE SULFATE 2 MG/ML
2 INJECTION, SOLUTION INTRAMUSCULAR; INTRAVENOUS EVERY 5 MIN PRN
Status: DISCONTINUED | OUTPATIENT
Start: 2019-05-03 | End: 2019-05-03 | Stop reason: HOSPADM

## 2019-05-03 RX ADMIN — SODIUM CHLORIDE, SODIUM LACTATE, POTASSIUM CHLORIDE, AND CALCIUM CHLORIDE: 600; 310; 30; 20 INJECTION, SOLUTION INTRAVENOUS at 19:17

## 2019-05-03 RX ADMIN — ROCURONIUM BROMIDE 30 MG: 10 INJECTION INTRAVENOUS at 17:33

## 2019-05-03 RX ADMIN — SUGAMMADEX 200 MG: 100 INJECTION, SOLUTION INTRAVENOUS at 19:57

## 2019-05-03 RX ADMIN — MORPHINE SULFATE 2 MG: 2 INJECTION, SOLUTION INTRAMUSCULAR; INTRAVENOUS at 03:06

## 2019-05-03 RX ADMIN — MORPHINE SULFATE 4 MG: 2 INJECTION, SOLUTION INTRAMUSCULAR; INTRAVENOUS at 06:29

## 2019-05-03 RX ADMIN — PREGABALIN 100 MG: 50 CAPSULE ORAL at 07:35

## 2019-05-03 RX ADMIN — MORPHINE SULFATE 4 MG: 2 INJECTION, SOLUTION INTRAMUSCULAR; INTRAVENOUS at 11:00

## 2019-05-03 RX ADMIN — PROPOFOL 130 MG: 10 INJECTION, EMULSION INTRAVENOUS at 17:33

## 2019-05-03 RX ADMIN — CEFAZOLIN 1000 MG: 330 INJECTION, POWDER, FOR SOLUTION INTRAMUSCULAR; INTRAVENOUS at 17:47

## 2019-05-03 RX ADMIN — SODIUM CHLORIDE, SODIUM LACTATE, POTASSIUM CHLORIDE, AND CALCIUM CHLORIDE: 600; 310; 30; 20 INJECTION, SOLUTION INTRAVENOUS at 16:33

## 2019-05-03 RX ADMIN — DEXAMETHASONE SODIUM PHOSPHATE 10 MG: 10 INJECTION INTRAMUSCULAR; INTRAVENOUS at 17:55

## 2019-05-03 RX ADMIN — ONDANSETRON HYDROCHLORIDE 4 MG: 2 INJECTION, SOLUTION INTRAMUSCULAR; INTRAVENOUS at 19:45

## 2019-05-03 RX ADMIN — MIDAZOLAM 1 MG: 1 INJECTION INTRAMUSCULAR; INTRAVENOUS at 16:32

## 2019-05-03 RX ADMIN — FAMOTIDINE 20 MG: 20 TABLET ORAL at 07:35

## 2019-05-03 RX ADMIN — ROPIVACAINE HYDROCHLORIDE 30 ML: 5 INJECTION, SOLUTION EPIDURAL; INFILTRATION; PERINEURAL at 16:24

## 2019-05-03 RX ADMIN — EPHEDRINE SULFATE 20 MG: 50 INJECTION, SOLUTION INTRAMUSCULAR; INTRAVENOUS; SUBCUTANEOUS at 18:40

## 2019-05-03 RX ADMIN — LIDOCAINE HYDROCHLORIDE 50 MG: 10 INJECTION, SOLUTION EPIDURAL; INFILTRATION; INTRACAUDAL; PERINEURAL at 17:33

## 2019-05-03 ASSESSMENT — PAIN SCALES - GENERAL
PAINLEVEL_OUTOF10: 7
PAINLEVEL_OUTOF10: 7
PAINLEVEL_OUTOF10: 5
PAINLEVEL_OUTOF10: 7
PAINLEVEL_OUTOF10: 5
PAINLEVEL_OUTOF10: 0
PAINLEVEL_OUTOF10: 0

## 2019-05-03 NOTE — H&P
[facility] Hospital[facility]      Hospitalist - History & Physical      PCP: Mary Ziegler MD    Date of Admission: 5/2/2019    Date of Service: 5/2/2019    Chief Complaint:  fall    History Of Present Illness:   80 y.o. female who presents to the emergency department via EMS in regards to a fall. Patient says she was walking outside of [facility] and tripped on the curb which caused the fall. She said she felt himself falling so she extended her right arm to try to catch herself she landed on the right side and then her face the concrete. He complains of pain in the right wrist has an obvious deformity, complains of facial pain and has obvious swelling to the left side of the face, she has a skin tear to the left thumb, and she also complains of low back pain. She complains of a dull headache. Denies LOC and denies hitting head. She takes a 81 mg aspirin daily but denies any other anticoagulants. She denies any chest pain or shortness of air. Patient has an [address] wrapped around the left lower extremity- she says she sees Wound Care in [place] for this- reports the wound started from a fall at her home. Imaging revealed fractures to right hand and zygomatic arch fx to face. Ortho surgery saw patient in the ER and plans to take patient to the OR tmrw. Past Medical History:        Diagnosis Date    Hypertension        Past Surgical History:        Procedure Laterality Date    MASTECTOMY, PARTIAL Left     TONSILLECTOMY         Home Medications:  Prior to Admission medications    Medication Sig Start Date End Date Taking?  Authorizing Provider   aspirin 81 MG EC tablet Take 81 mg by mouth daily   Yes Historical Provider, MD   valsartan-hydrochlorothiazide (DIOVAN-HCT) 320-25 MG per tablet Take 1 tablet by mouth daily   Yes Historical Provider, MD   omeprazole (PRILOSEC) 40 MG delayed release capsule Take 40 mg by mouth daily   Yes Historical Provider, MD   hydrALAZINE (APRESOLINE) 25 MG tablet Take 25 mg by mouth 3 times daily   Yes Historical Provider, MD   pregabalin (LYRICA) 100 MG capsule Take 100 mg by mouth 2 times daily. Yes Historical Provider, MD       Allergies:    Erythromycin and Iv [iodides]    Social History:    The patient currently lives at home alone  Tobacco:   reports that she has never smoked. She has never used smokeless tobacco.  Alcohol:   reports that she drank alcohol. Illicit Drugs: Never    Family History:  History reviewed. No pertinent family history. Review of Systems:   Constitutional / general:  No fever / chills / sweats  HEENT: No sore throat / hoarseness / vision changes  CV:  No chest pain / palpitations/ orthopnea   Respiratory:  No cough / shortness of breath / sputum / hemoptysis  GI: No nausea / vomiting / abdominal pain / diarrhea / constipation  :  No dysuria / hesitancy / urgency / hematuria   Neuro: No muscle weakness / dysphagia / headache / paresthesias  Musculoskeletal: + pain to right hand/forearm, left thumb and face, No edema / cyanosis  Psychiatric:  No depression / anxiety / insomnia  Skin:  No new rashes / lesions    Physical Examination:        BP (!) 166/79   Pulse 69   Temp 97.9 °F (36.6 °C) (Temporal)   Resp 14   Ht 5' 6\" (1.676 m)   Wt 170 lb (77.1 kg)   SpO2 95%   BMI 27.44 kg/m²   General appearance: No apparent distress, appears stated age and cooperative. Well developed and well groomed. HEENT: bruising to right side of face near eyebrow, Pupils equal, round, and reactive to light. Extra ocular muscles intact. Conjunctivae/corneas clear. Normal ears and nose. Neck: Supple, with full range of motion. No jugular venous distention. Trachea midline. Thyroid no masses noted. Respiratory: Normal respiratory effort. Clear to auscultation bilaterally, without Rales/Wheezes/Rhonchi. Cardiovascular: Regular rate and rhythm with normal S1/S2 without murmurs, rubs or gallops.    Abdomen: Soft, non-tender, non-distended with normal bowel sounds. Musculoskeletal:+ splint to left thumb, right hand and arm in dressing, limited ROM  Skin: Skin color, texture, turgor normal.  No rashes or lesions. Neurologic:  Neurovascularly intact without any focal sensory/motor deficits. Cranial nerves: II-XII intact, grossly non-focal.  Psychiatric: Alert and oriented, thought content appropriate, normal insight. Diagnostic Data:  Imaging:   XR FINGER LEFT (MIN 2 VIEWS)   Final Result   3 views of the left thumb were obtained. There is an acute,   nondisplaced fracture through the base of the 1st proximal phalanx. No   articular disruption is identified. Severe degenerative changes are   present in the 1st carpometacarpal joint. Signed by Dr Josie De Oliveira on 5/2/2019 8:29 PM      XR WRIST RIGHT (2 VIEWS)   Final Result   2 views of the right wrist demonstrate no significant improvement in   the severely displaced fracture through the distal radial metaphysis   and volar displacement by approximately one shaft width. There is   questionable ulnocarpal dislocation that was not appreciated on the   previous exam.   Signed by Dr Josie De Oliveira on 5/2/2019 8:30 PM      XR CHEST PORTABLE   Final Result   1. No radiographic evidence of acute cardiopulmonary process. Signed by Dr Josie De Oliveira on 5/2/2019 7:03 PM      XR HIP BILATERAL W AP PELVIS (2 VIEWS)   Final Result   1. Moderate to marked degenerative changes in the hips. No fracture. Signed by Dr Josie De Oliveira on 5/2/2019 7:04 PM      XR TIBIA FIBULA LEFT (2 VIEWS)   Final Result   1. No acute bony abnormality in the left knee or lower leg. 2. Moderate degenerative changes in the left knee joint. Signed by Dr Josie De Oliveira on 5/2/2019 7:06 PM      XR KNEE LEFT (1-2 VIEWS)   Final Result   1. No acute bony abnormality in the left knee or lower leg. 2. Moderate degenerative changes in the left knee joint.    Signed by Dr Josie De Oliveira on 5/2/2019 7:06 PM      XR WRIST RIGHT (MIN 3 VIEWS)   Final

## 2019-05-03 NOTE — PROGRESS NOTES
4 Eyes Skin Assessment    Nicole Queen is being assessed upon: Admission    I agree that I, Nurys Jean Baptiste, along with princess ferro (either 2 RN's or 1 LPN and 1 RN) have performed a thorough Head to Toe Skin Assessment on the patient. ALL assessment sites listed below have been assessed. Areas assessed by both nurses:     [x]   Head, Face, and Ears   [x]   Shoulders, Back, and Chest  [x]   Arms, Elbows, and Hands   [x]   Coccyx, Sacrum, and Ischium  [x]   Legs, Feet, and Heels    Does the Patient have Skin Breakdown?  No      Rafa Prevention initiated: NA  Wound Care Orders initiated: yes      [facility] nurse consulted for Pressure Injury (Stage 3,4, Unstageable, DTI, NWPT, and Complex wounds) and New or Established Ostomies: Yes        Primary Nurse eSignature: Nurys Jean Baptiste RN on 5/3/2019 at 12:04 AM      Co-Signer eSignature: {Esignature:[id number]}

## 2019-05-03 NOTE — PROGRESS NOTES
[id number] [facility]      Patient: Napoleon Lloyd  YOB: 1931  Date of Service: 5/3/2019  MRN: [id number]   Acct: [de-identified]   Primary Care Physician: Louise Perez MD  Advance Directive: Full Code  Admit Date: 5/2/2019       Hospital Day: 1    CHIEF COMPLAINT Fall    SUBJECTIVE:  Ms. Connor De complains of right wrist pain. Would like purewick to help prevent leaks from bed pan when she needs to urine. Denies dyspnea or chest pain. Review of Systems:   14 point review of systems is negative except as specifically addressed above. Objective:   VITALS:  BP (!) 160/83   Pulse 85   Temp 98.4 °F (36.9 °C) (Temporal)   Resp 16   Ht 5' 6\" (1.676 m)   Wt 191 lb 12.8 oz (87 kg)   SpO2 98%   BMI 30.96 kg/m²   24HR INTAKE/OUTPUT:      Intake/Output Summary (Last 24 hours) at 5/3/2019 1426  Last data filed at 5/3/2019 1329  Gross per 24 hour   Intake 0 ml   Output --   Net 0 ml     General appearance: alert, appears stated age, cooperative and no distress  Head: Normocephalic, without obvious abnormality, Left maxillary area edematous with ecchymosis  Eyes: conjunctivae/corneas clear. PERRL, EOM's intact. Ears: normal external ears and nose, throat without exudate  Neck: no adenopathy, no carotid bruit, no JVD, supple, symmetrical, trachea midline and thyroid not enlarged, symmetric, no tenderness/mass/nodules  Lungs: clear to auscultation bilaterally,no rales or wheezes   Heart: regular rate and rhythm, S1, S2 normal, no murmur  Abdomen:soft, non-tender; non-distended, normal bowel sounds no masses, no organomegaly  Extremities: LLE with ace wrap from foot to knee-I did not remove, Right arm in splint and left thumb in splint-I did not remove  Skin: Skin color, texture, turgor normal. No rashes or lesions  Lymphatic: No palpable lymph node enlargment  Neurologic: Alert and oriented X 3, normal strength and tone. Normal symmetric reflexes.  Mental status: Alert, oriented, thought content appropriate  Cranial nerves:II-XII Grossly intact Sensory: normal Motor:grossly normal  Psychiatric: Alert and oriented, thought content appropriate, normal insight, mood appropriate    Medications:      sodium chloride 75 mL/hr at 05/02/19 2310      sodium chloride flush  10 mL Intravenous 2 times per day    enoxaparin  40 mg Subcutaneous Daily    famotidine  20 mg Oral BID    pregabalin  100 mg Oral BID     morphine, sodium chloride flush, potassium chloride **OR** potassium alternative oral replacement **OR** potassium chloride, magnesium sulfate, magnesium hydroxide, ondansetron, acetaminophen  Diet NPO, After Midnight     Lab and other Data:     Recent Labs     05/02/19 1755 05/03/19 0141   WBC 8.1 8.5   HGB 10.7* 9.7*    233     Recent Labs     05/02/19 1755 05/03/19 0141   * 131*   K 4.0 3.9   CL 95* 96*   CO2 26 24   BUN 36* 34*   CREATININE 1.0* 0.9   GLUCOSE 99 126*     Recent Labs     05/02/19 1755   AST 21   ALT 20   BILITOT <0.2   ALKPHOS 68     INR:   Recent Labs     05/02/19 1755   INR 1.08     RAD:   Xr Radius Ulna Right (2 Views)  1. Markedly displaced fracture through the distal right radial metaphysis with intra-articular components and approximately 2.4 cm of the lumbar displacement. 2. Acute, intra-articular, comminuted fracture through the base of the 1st distal phalanx. 3. Moderate degenerative changes in the hand and wrist. Signed by Dr Elisa Osborne on 5/2/2019 5:33 PM    Xr Wrist Right (2 Views)  2 views of the right wrist demonstrate no significant improvement in the severely displaced fracture through the distal radial metaphysis and volar displacement by approximately one shaft width. There is questionable ulnocarpal dislocation that was not appreciated on the previous exam. Signed by Dr Elisa Osborne on 5/2/2019 8:30 PM    Xr Wrist Right (min 3 Views)  1.  Markedly displaced fracture through the distal right radial metaphysis with intra-articular components and approximately 2.4 cm of the lumbar displacement. 2. Acute, intra-articular, comminuted fracture through the base of the 1st distal phalanx. 3. Moderate degenerative changes in the hand and wrist. Signed by Dr Namita Kwan on 5/2/2019 5:33 PM    Xr Hand Right (min 3 Views)  1. Markedly displaced fracture through the distal right radial metaphysis with intra-articular components and approximately 2.4 cm of the lumbar displacement. 2. Acute, intra-articular, comminuted fracture through the base of the 1st distal phalanx. 3. Moderate degenerative changes in the hand and wrist. Signed by Dr Namita Kwan on 5/2/2019 5:33 PM    Xr Hip Bilateral W Ap Pelvis (2 Views)  1. Moderate to marked degenerative changes in the hips. No fracture. Signed by Dr Namita Kwan on 5/2/2019 7:04 PM    Xr Knee Left (1-2 Views)  1. No acute bony abnormality in the left knee or lower leg. 2. Moderate degenerative changes in the left knee joint. Signed by Dr Namita Kwan on 5/2/2019 7:06 PM    Xr Tibia Fibula Left (2 Views)  1. No acute bony abnormality in the left knee or lower leg. 2. Moderate degenerative changes in the left knee joint. Signed by Dr Namita Kwan on 5/2/2019 7:06 PM    Ct Head Wo Contrast  1. Mild cerebral and cerebellar volume loss with chronic microvascular disease but no evidence of acute intracranial process. 2. Air-fluid level in the left maxillary sinus would be described in the facial bone CT. Signed by Dr Namita Kwan on 5/2/2019 5:36 PM    Ct Facial Bones Wo Contrast  1. Acute, comminuted and depressed fracture through the anterior wall of the left maxillary sinus with associated hemorrhage in the left maxillary sinus. 2. Minimally displaced, acute fracture through the left nasal bone. Signed by Dr Namita Kwan on 5/2/2019 5:38 PM    Ct Cervical Spine Wo Contrast  1. Degenerative changes in the cervical spine without evidence of acute osseous injury.  Signed by Dr Namita Kwan on 5/2/2019 5:42 PM    Ct Lumbar Spine Wo Contrast  1. Multilevel degenerative changes in the lumbar spine without evidence of acute fracture or subluxation. Signed by Dr Namita Kwan on 5/2/2019 5:45 PM    Xr Finger Left (min 2 Views)  3 views of the left thumb were obtained. There is an acute, nondisplaced fracture through the base of the 1st proximal phalanx. No articular disruption is identified. Severe degenerative changes are present in the 1st carpometacarpal joint. Signed by Dr Namita Kwan on 5/2/2019 8:29 PM    Ct Wrist Right Wo Contrast  1. Severely comminuted and displaced fracture of the distal radius. A large palmar fragment is impacted proximally at least 7 mm and the carpal bones are associated with this fragment showing proximal displacement. The larger dorsal component of the distal radius overrides the carpal bones by almost 1.5 cm at its greatest point. Signed by Dr Fred Coppola on 5/3/2019 2:05 PM    Xr Chest Portable  1. No radiographic evidence of acute cardiopulmonary process. Signed by Dr Namita Kwan on 5/2/2019 7:03 PM    Assessment/Plan   Principal Problem:    Closed fracture of right distal radius  Active Problems:    Closed nondisplaced fracture of proximal phalanx of left thumb    Open wound of left lower leg    Hypertension  Resolved Problems:    * No resolved hospital problems. *     Morphine as needed for pain control. Plans for OR this afternoon. Consult PT/OT, may need rehab. Further orders per clinical course. DVT Prophylaxis: Lovenox    GI prophylaxis:  Pepcid    Maia Schilling PA-C       Patient was examined independently. Chart reviewed and events noted. No new complaints other than stated above. Vitals noted. Chest CTAB. S1 S2 RRR. GI bening. Neuro no new deficits. Labs noted. Medically stable for planned procedure. Agree with above assessment and plan. Will continue to follow.     Jae Gitelman, MD  Hospitalist service  5/3/2019   4:00 PM

## 2019-05-03 NOTE — ANESTHESIA PRE PROCEDURE
 [MAR Hold] magnesium sulfate 1 g in dextrose 5% 100 mL IVPB  1 g Intravenous PRN Renella Cabot, MD        [facility] Hold] magnesium hydroxide (MILK OF MAGNESIA) 400 MG/5ML suspension 30 mL  30 mL Oral Daily PRN Domingo Rey MD        [facility] Hold] [facility]dansetron [facility] injection 4 mg  4 mg Intravenous Q6H PRN Renella Cabot, MD        [facility] Hold] enoxaparin (LOVENOX) injection 40 mg  40 mg Subcutaneous Daily Renella Cabot, MD   Stopped at 05/03/19 0735    [MAR Hold] acetaminophen (TYLENOL) tablet 650 mg  650 mg Oral Q4H PRN Domingo Rey MD        [facility] Hold] 0.9 % sodium chloride infusion   Intravenous Continuous Domingo Soliman MD 75 mL/hr at 05/02/19 2310      [MAR Hold] famotidine (PEPCID) tablet 20 mg  20 mg Oral BID Domingo Rey MD   20 mg at 05/03/19 0735    [MAR Hold] pregabalin (LYRICA) capsule 100 mg  100 mg Oral BID Renella Cabot, MD   100 mg at 05/03/19 0735       Allergies:     Allergies   Allergen Reactions    Erythromycin     Iv [Iodides]        Problem List:    Patient Active Problem List   Diagnosis Code    Closed fracture of right distal radius S52.501A    Closed nondisplaced fracture of proximal phalanx of left thumb S62.515A    Open wound of left lower leg S81.802A    Hypertension I10    Open displaced fracture of phalanx of right thumb S62.501B       Past Medical History:        Diagnosis Date    Hypertension        Past Surgical History:        Procedure Laterality Date    MASTECTOMY, PARTIAL Left     TONSILLECTOMY         Social History:    Social History     Tobacco Use    Smoking status: Never Smoker    Smokeless tobacco: Never Used   Substance Use Topics    Alcohol use: Not Currently                                Counseling given: Not Answered      Vital Signs (Current):   Vitals:    05/03/19 0401 05/03/19 0637 05/03/19 1016 05/03/19 1457   BP:  (!) 185/82 (!) 160/83 (!) 190/79   Pulse:  79 85 86   Resp:  16 16 16   Temp:  98.6 °F (37 °C) 98.4 °F (36.9 °C) 98.3 °F (36.8 °C) TempSrc:  Temporal Temporal Temporal   SpO2:  94% 98% 97%   Weight: 191 lb 12.8 oz (87 kg)      Height:                                                  BP Readings from Last 3 Encounters:   05/03/19 (!) 190/79       NPO Status: Time of last liquid consumption: 0000                        Time of last solid consumption: 0000                        Date of last liquid consumption: 05/02/19                        Date of last solid food consumption: 05/02/19    BMI:   Wt Readings from Last 3 Encounters:   05/03/19 191 lb 12.8 oz (87 kg)     Body mass index is 30.96 kg/m². CBC:   Lab Results   Component Value Date    WBC 8.5 05/03/2019    RBC 3.10 05/03/2019    HGB 9.7 05/03/2019    HCT 29.1 05/03/2019    MCV 93.9 05/03/2019    RDW 13.2 05/03/2019     05/03/2019       CMP:   Lab Results   Component Value Date     05/03/2019    K 3.9 05/03/2019    CL 96 05/03/2019    CO2 24 05/03/2019    BUN 34 05/03/2019    CREATININE 0.9 05/03/2019    LABGLOM 59 05/03/2019    GLUCOSE 126 05/03/2019    PROT 8.1 05/02/2019    CALCIUM 8.8 05/03/2019    BILITOT <0.2 05/02/2019    ALKPHOS 68 05/02/2019    AST 21 05/02/2019    ALT 20 05/02/2019       POC Tests: No results for input(s): POCGLU, POCNA, POCK, POCCL, POCBUN, POCHEMO, POCHCT in the last 72 hours.     Coags:   Lab Results   Component Value Date    PROTIME 13.4 05/02/2019    INR 1.08 05/02/2019    APTT 24.7 05/02/2019       HCG (If Applicable): No results found for: PREGTESTUR, PREGSERUM, HCG, HCGQUANT     ABGs: No results found for: PHART, PO2ART, AJZ0KHZ, KER5INX, BEART, A2IAXLVE     Type & Screen (If Applicable):  No results found for: LABABO, [address]    Anesthesia Evaluation  Patient summary reviewed and Nursing notes reviewed no history of anesthetic complications:   Airway: Mallampati: II  TM distance: <3 FB   Neck ROM: full  Mouth opening: < 3 FB Dental: normal exam         Pulmonary:Negative Pulmonary ROS and normal exam Cardiovascular:  Exercise tolerance: good (>4 METS),   (+) hypertension:,       ECG reviewed               Beta Blocker:  Not on Beta Blocker      ROS comment: RBBB on EKG     Neuro/Psych:   Negative Neuro/Psych ROS               ROS comment: CT facial bones:  1. Acute, comminuted and depressed fracture through the anterior wall  of the left maxillary sinus with associated hemorrhage in the left  maxillary sinus. 2. Minimally displaced, acute fracture through the left nasal bone. GI/Hepatic/Renal:   (+) GERD: well controlled, PUD,           Endo/Other: Negative Endo/Other ROS   (+) blood dyscrasia: anemia:., electrolyte abnormalities (hyponatremia, hypochloremia), malignancy/cancer (breast cancer s/p mastectomy). Abdominal:           Vascular: negative vascular ROS. Anesthesia Plan      general and regional     ASA 2     (CT scans reviewed. Head and C-spine and L spine all negative.)  Induction: intravenous. BIS  MIPS: Postoperative opioids intended and Prophylactic antiemetics administered. Anesthetic plan and risks discussed with patient.                       Iggy Larson,    5/3/2019

## 2019-05-03 NOTE — ANESTHESIA PROCEDURE NOTES
Peripheral Block    Patient location during procedure: holding area  Start time: 5/3/2019 4:24 PM  End time: 5/3/2019 4:24 PM  Staffing  Anesthesiologist: Chuyita Veliz DO  Performed: anesthesiologist   Preanesthetic Checklist  Completed: patient identified, site marked, surgical consent, pre-op evaluation, timeout performed, IV checked, risks and benefits discussed, monitors and equipment checked, anesthesia consent given, oxygen available and patient being monitored  Peripheral Block  Patient position: supine  Prep: ChloraPrep  Patient monitoring: cardiac monitor, continuous pulse ox, frequent blood pressure checks and IV access  Block type: Brachial plexus  Laterality: right  Injection technique: single-shot  Procedures: ultrasound guided  Local infiltration: ropivacaine  Supraclavicular  Provider prep: sterile gloves and mask  Local infiltration: ropivacaine  Needle  Needle type: combined needle/nerve stimulator   Needle gauge: 22 G  Needle length: 5 cm  Needle localization: ultrasound guidance  Test dose: negative  Assessment  Injection assessment: negative aspiration for heme, no paresthesia on injection and local visualized surrounding nerve on ultrasound  Paresthesia pain: none  Slow fractionated injection: yes  Hemodynamics: stable  Additional Notes   Needle advancement was under direct vision at all times . Local Anesthesia was injected and all vascular structures were avoided. The local anesthetic hydrodissected in a perineural fashion. Ropivicaine 0.5% injected in divided doses, total of 20 cc injected. The plexus appeared anatomically normal and no obvious pathology was noted.    Reason for block: post-op pain management

## 2019-05-03 NOTE — ED NOTES
Dr. Angélica Gary is at the bedside with SLOAN Crawley reducing pts R wrist and placing splint.      Jade Osorio RN  05/02/19 8617

## 2019-05-03 NOTE — CONSULTS
[iodides]    Social History:   Social History     Socioeconomic History    Marital status:      Spouse name: Not on file    Number of children: Not on file    Years of education: Not on file    Highest education level: Not on file   Occupational History    Not on file   Social Needs    Financial resource strain: Not on file    Food insecurity:     Worry: Not on file     Inability: Not on file    Transportation needs:     Medical: Not on file     Non-medical: Not on file   Tobacco Use    Smoking status: Never Smoker    Smokeless tobacco: Never Used   Substance and Sexual Activity    Alcohol use: Not Currently    Drug use: Never    Sexual activity: Not on file   Lifestyle    Physical activity:     Days per week: Not on file     Minutes per session: Not on file    Stress: Not on file   Relationships    Social connections:     Talks on phone: Not on file     Gets together: Not on file     Attends Religion service: Not on file     Active member of club or organization: Not on file     Attends meetings of clubs or organizations: Not on file     Relationship status: Not on file    Intimate partner violence:     Fear of current or ex partner: Not on file     Emotionally abused: Not on file     Physically abused: Not on file     Forced sexual activity: Not on file   Other Topics Concern    Not on file   Social History Narrative    Not on file       Family History:   History reviewed. No pertinent family history. REVIEW OF SYSTEMS:  14 point review of systems has been reviewed from the patient's emergency room visit, reviewed with the patient on today's date with no new changes.     PHYSICAL EXAM:      Physical Examination:  Vitals:   Vitals:    05/02/19 2035 05/02/19 2102 05/02/19 2105 05/02/19 2138   BP: 134/63  (!) 149/58 (!) 89/49   Pulse: 80 78 80 70   Resp:    16   Temp:    98.2 °F (36.8 °C)   TempSrc:    Temporal   SpO2: 97% 95% 94% 92%   Weight:       Height:         General:  Appears stated age, no distress. Orientation:  Alert and oriented to time, place, and person. HEENT: Facial edema, most severe to left buccal region, no open wounds. No gross visual acuity deficits. Grossly decreased auditory acuity. Mood and Affect:  Cooperative and pleasant. Gait:  Resting comfortably in bed. Cardiovascular:  Symmetric 2 plus pulses in upper and lower extremities. Sensation:  Grossly intact to light touch. Coordination/balance:  Normal  Musculoskeletal:  Right upper extremity exam:  Gross deformity to right wrist with associated edema, ecchymosis. No open skin wounds/lesions associated with wrist. Edema and ecchymosis to right thumb. No gross deformity, edema or ecchymosis to proximal upper extremity. Tenderness to right wrist and thumb. No significant tenderness to remaining hand, proximal forearm, elbow humerus or shoulder. Gentle elbow and shoulder ROM without discomfort. ROM to wrist deferred. AIN, PIN and ulnar nerves motor intact. Gross sensation intact to median, radial and ulnar nerves. Radial pulse 2+. Left upper extremity exam:  Traumatic 0.5cm laceration to left thumb at IP flexion crease, slow oozing. No gross deformity to upper extremity. Tenderness to left thumb, otherwise no significant tenderness. Limited motion to left thumb due to pain. No significant pain with wrist, elbow or shoulder ROM. AIN, PIN and ulnar nerves motor intact. Gross sensation intact to median, radial and ulnar nerves. Radial pulse 2+. Right lower extremity exam:  There is no tenderness to palpation about the hip, knee, ankle or foot. Unrestricted full function motion is present. Stability is normal with provocative tests, 5/5 strength, and skin is normal.     Left lower extremity exam:  Dressing to left lower extremity, clean, dry and intact. Recently changed. No significant edema or erythema to left lower extremity. No gross deformity. Tenderness to tibia near wound.  Otherwise, no tenderness to foot, ankle, knee or thigh. No pain with log roll. Performs straight leg raise. EHL/FHL/TA/GS motor intact. Gross sensation intact to sural, saphenous, deep/superficial peroneal and tibial nerve distributions. Foot is warm to touch, DP 1+. DATA:    CBC with Differential:    Lab Results   Component Value Date    WBC 8.1 05/02/2019    RBC 3.48 05/02/2019    HGB 10.7 05/02/2019    HCT 32.2 05/02/2019     05/02/2019    MCV 92.5 05/02/2019    MCH 30.7 05/02/2019    MCHC 33.2 05/02/2019    RDW 13.2 05/02/2019    LYMPHOPCT 17.6 05/02/2019    MONOPCT 10.1 05/02/2019    BASOPCT 1.0 05/02/2019    MONOSABS 0.80 05/02/2019    LYMPHSABS 1.4 05/02/2019    EOSABS 0.10 05/02/2019    BASOSABS 0.10 05/02/2019     CMP:    Lab Results   Component Value Date     05/02/2019    K 4.0 05/02/2019    CL 95 05/02/2019    CO2 26 05/02/2019    BUN 36 05/02/2019    CREATININE 1.0 05/02/2019    LABGLOM 52 05/02/2019    GLUCOSE 99 05/02/2019    PROT 8.1 05/02/2019    CALCIUM 9.6 05/02/2019    BILITOT <0.2 05/02/2019    ALKPHOS 68 05/02/2019    AST 21 05/02/2019    ALT 20 05/02/2019     BMP:    Lab Results   Component Value Date     05/02/2019    K 4.0 05/02/2019    CL 95 05/02/2019    CO2 26 05/02/2019    BUN 36 05/02/2019    CREATININE 1.0 05/02/2019    CALCIUM 9.6 05/02/2019    LABGLOM 52 05/02/2019    GLUCOSE 99 05/02/2019         Radiology: I have reviewed the radiology images listed below and agree with the findings of the interpreting radiologist(s). Xr Radius Ulna Right (2 Views)    Result Date: 5/2/2019  XR WRIST RIGHT (MIN 3 VIEWS), XR RADIUS ULNA RIGHT (2 VIEWS), XR HAND RIGHT (MIN 3 VIEWS) 5/2/2019 4:00 PM HISTORY: Patient fell and complains of right arm pain COMPARISON: None. Findings: 3 views of the right wrist, 3 views of the right hand, and 2 views of the right forearm were obtained. There is an acute, moderately to markedly displaced fracture through the distal right radius.  This is comminuted and extends into the joint space. The distal fragment is displaced by one shaft width in the plantar direction. There is no evidence of carpal dislocation. Degenerative changes are seen in the distal radioulnar joint, triscaphe joint, and 1st carpometacarpal joint. There is no other fractures are seen in the radius or ulna. There is an acute, comminuted fracture through the base of the 1st distal phalanx. This extends into the interphalangeal joint, and there is moderate articular offset. Degenerative changes are seen in the interphalangeal joints. 1. Markedly displaced fracture through the distal right radial metaphysis with intra-articular components and approximately 2.4 cm of the lumbar displacement. 2. Acute, intra-articular, comminuted fracture through the base of the 1st distal phalanx. 3. Moderate degenerative changes in the hand and wrist. Signed by Dr William Murrell on 5/2/2019 5:33 PM    Xr Wrist Right (2 Views)    Result Date: 5/2/2019  XR WRIST RIGHT (MIN 3 VIEWS) 5/2/2019 6:45 PM HISTORY: Postreduction of the right wrist fracture COMPARISON: 5/2/2019 at 5:06 PM    2 views of the right wrist demonstrate no significant improvement in the severely displaced fracture through the distal radial metaphysis and volar displacement by approximately one shaft width. There is questionable ulnocarpal dislocation that was not appreciated on the previous exam. Signed by Dr William Murrell on 5/2/2019 8:30 PM    Xr Wrist Right (min 3 Views)    Result Date: 5/2/2019  XR WRIST RIGHT (MIN 3 VIEWS), XR RADIUS ULNA RIGHT (2 VIEWS), XR HAND RIGHT (MIN 3 VIEWS) 5/2/2019 4:00 PM HISTORY: Patient fell and complains of right arm pain COMPARISON: None. Findings: 3 views of the right wrist, 3 views of the right hand, and 2 views of the right forearm were obtained. There is an acute, moderately to markedly displaced fracture through the distal right radius. This is comminuted and extends into the joint space.  The distal fragment is displaced by one shaft width in the plantar direction. There is no evidence of carpal dislocation. Degenerative changes are seen in the distal radioulnar joint, triscaphe joint, and 1st carpometacarpal joint. There is no other fractures are seen in the radius or ulna. There is an acute, comminuted fracture through the base of the 1st distal phalanx. This extends into the interphalangeal joint, and there is moderate articular offset. Degenerative changes are seen in the interphalangeal joints. 1. Markedly displaced fracture through the distal right radial metaphysis with intra-articular components and approximately 2.4 cm of the lumbar displacement. 2. Acute, intra-articular, comminuted fracture through the base of the 1st distal phalanx. 3. Moderate degenerative changes in the hand and wrist. Signed by Dr Gilbert Thomas on 5/2/2019 5:33 PM    Xr Hand Right (min 3 Views)    Result Date: 5/2/2019  XR WRIST RIGHT (MIN 3 VIEWS), XR RADIUS ULNA RIGHT (2 VIEWS), XR HAND RIGHT (MIN 3 VIEWS) 5/2/2019 4:00 PM HISTORY: Patient fell and complains of right arm pain COMPARISON: None. Findings: 3 views of the right wrist, 3 views of the right hand, and 2 views of the right forearm were obtained. There is an acute, moderately to markedly displaced fracture through the distal right radius. This is comminuted and extends into the joint space. The distal fragment is displaced by one shaft width in the plantar direction. There is no evidence of carpal dislocation. Degenerative changes are seen in the distal radioulnar joint, triscaphe joint, and 1st carpometacarpal joint. There is no other fractures are seen in the radius or ulna. There is an acute, comminuted fracture through the base of the 1st distal phalanx. This extends into the interphalangeal joint, and there is moderate articular offset. Degenerative changes are seen in the interphalangeal joints.     1. Markedly displaced fracture through the distal right radial metaphysis with intra-articular components and approximately 2.4 cm of the lumbar displacement. 2. Acute, intra-articular, comminuted fracture through the base of the 1st distal phalanx. 3. Moderate degenerative changes in the hand and wrist. Signed by Dr Emerson Castellon on 5/2/2019 5:33 PM    Xr Hip Bilateral W Ap Pelvis (2 Views)    Result Date: 5/2/2019  XR HIP BILATERAL W AP PELVIS (2 VIEWS) 5/2/2019 5:00 PM HISTORY: Fall, bilateral hip pain COMPARISON: None FINDINGS: Frontal view of the pelvis and frontal and lateral views of bilateral hips were obtained. There is no fracture or dislocation. Moderate to marked joint space narrowing is seen in both hips. A calcified uterine fibroid is noted. 1. Moderate to marked degenerative changes in the hips. No fracture. Signed by Dr Emerson Castellon on 5/2/2019 7:04 PM    Xr Knee Left (1-2 Views)    Result Date: 5/2/2019  XR TIBIA FIBULA LEFT (2 VIEWS), XR KNEE LEFT (1-2 VIEWS) 5/2/2019 5:15 PM HISTORY: Left leg pain and knee pain after a fall COMPARISON: NONE FINDINGS: Frontal and lateral radiographs of the left lower leg were obtained. Frontal and lateral radiographs of the left knee were also obtained. A compression stocking is present. The bones are intact. Moderate degenerative changes are seen in the left knee joint. The soft tissues are grossly unremarkable. 1. No acute bony abnormality in the left knee or lower leg. 2. Moderate degenerative changes in the left knee joint. Signed by Dr Emerson Castellon on 5/2/2019 7:06 PM    Xr Tibia Fibula Left (2 Views)    Result Date: 5/2/2019  XR TIBIA FIBULA LEFT (2 VIEWS), XR KNEE LEFT (1-2 VIEWS) 5/2/2019 5:15 PM HISTORY: Left leg pain and knee pain after a fall COMPARISON: NONE FINDINGS: Frontal and lateral radiographs of the left lower leg were obtained. Frontal and lateral radiographs of the left knee were also obtained. A compression stocking is present. The bones are intact. Moderate degenerative changes are seen in the left knee joint. The soft tissues are grossly unremarkable. 1. No acute bony abnormality in the left knee or lower leg. 2. Moderate degenerative changes in the left knee joint. Signed by Dr Vero Giles on 5/2/2019 7:06 PM    Ct Head Wo Contrast    Result Date: 5/2/2019  CT HEAD WO CONTRAST 5/2/2019 3:45 PM HISTORY: Headache after a fall COMPARISON: None DLP: 895 mGy cm. In order to have a CT radiation dose as low as reasonably achievable, Automated Exposure Control was utilized for adjustment of the mA and/or KV according to patient size. TECHNIQUE: Serial axial tomographic images of the brain were obtained without the use of intravenous contrast. FINDINGS: The midline structures are nondisplaced. There is mild cerebral and cerebellar volume loss, with an associated increase in the prominence of the ventricles and sulci. The basilar cisterns are normal in size and configuration. There is an old lacunar infarct in the left basal ganglia. There is no evidence of intracranial hemorrhage or mass-effect. There is low attenuation in the periventricular white matter, consistent with chronic ischemic change. There are no abnormal extra-axial fluid collections. There is no evidence of tonsillar herniation. The included orbits and their contents are unremarkable. There is an air-fluid level in the left maxillary sinus. The visualized osseous structures and overlying soft tissues of the skull and face are intact. 1. Mild cerebral and cerebellar volume loss with chronic microvascular disease but no evidence of acute intracranial process. 2. Air-fluid level in the left maxillary sinus would be described in the facial bone CT. Signed by Dr Vero Giles on 5/2/2019 5:36 PM    Ct Facial Bones Wo Contrast    Result Date: 5/2/2019  CT FACIAL BONES WO CONTRAST 5/2/2019 4:00 PM HISTORY: Fall, left cheek swelling COMPARISON: None DLP: 358 mGy cm.  In order to have a CT radiation dose as low as reasonably achievable, Automated Exposure Control was utilized for adjustment of the mA and/or KV according to patient size. TECHNIQUE: Serial helical tomographic images of the facial bones were obtained without the use of intravenous contrast. Additionally, multiplanar reformats in the axial and coronal planes were also obtained. FINDINGS: There is and acute, depressed fracture through the anterior wall of the left maxillary sinus. An associated air-fluid level is present. There is an acute fracture through the left nasal bone that is nondisplaced. No other facial bone fractures are seen. The remaining sinuses are clear. The mastoid air cells are clear. Moderate soft tissue swelling is seen in the left cheek. 1. Acute, comminuted and depressed fracture through the anterior wall of the left maxillary sinus with associated hemorrhage in the left maxillary sinus. 2. Minimally displaced, acute fracture through the left nasal bone. Signed by Dr Michaelle Brown on 5/2/2019 5:38 PM    Ct Cervical Spine Wo Contrast    Result Date: 5/2/2019  CT CERVICAL SPINE WO CONTRAST 5/2/2019 3:45 PM HISTORY: Neck pain following trauma COMPARISON: None DLP: 469 mGy cm. In order to have a CT radiation dose as low as reasonably achievable, Automated Exposure Control was utilized for adjustment of the mA and/or KV according to patient size. TECHNIQUE: Serial helical tomographic images of the cervical spine were obtained without the use of intravenous contrast. Additionally, sagittal and coronal reformatted images were also provided for review. FINDINGS: Multilevel degenerative changes are seen in the cervical spine. There is no evidence of acute fracture or subluxation. The prevertebral soft tissues are within normal limits. The posterior elements are intact. Vertebral body heights are maintained. The visualized skull base is intact. The visualized thorax demonstrates no acute abnormality. 1. Degenerative changes in the cervical spine without evidence of acute osseous injury.  Signed by Dr Mary Diallo on 5/2/2019 5:42 PM    Ct Lumbar Spine Wo Contrast    Result Date: 5/2/2019  CT LUMBAR SPINE WO CONTRAST 5/2/2019 3:45 PM HISTORY: Back pain after a fall. COMPARISON: None DLP: 1585 mGy cm. In order to have a CT radiation dose as low as reasonably achievable, Automated Exposure Control was utilized for adjustment of the mA and/or KV according to patient size. TECHNIQUE: Serial helical tomographic images of the lumbar spine were obtained without the use of intravenous contrast. Additionally, sagittal and coronal reformatted images were provided for review. FINDINGS: There is no evidence of fracture or subluxation. Normal lumbar lordosis is maintained. Multilevel degenerative changes are seen. Vertebral body heights are preserved. The visualized soft tissues are unremarkable. 1. Multilevel degenerative changes in the lumbar spine without evidence of acute fracture or subluxation. Signed by Dr Mary Diallo on 5/2/2019 5:45 PM    Xr Finger Left (min 2 Views)    Result Date: 5/2/2019  XR FINGER LEFT (MIN 2 VIEWS) 5/2/2019 6:45 PM HISTORY: Left thumb pain COMPARISON: None. 3 views of the left thumb were obtained. There is an acute, nondisplaced fracture through the base of the 1st proximal phalanx. No articular disruption is identified. Severe degenerative changes are present in the 1st carpometacarpal joint. Signed by Dr Mary Diallo on 5/2/2019 8:29 PM    Xr Chest Portable    Result Date: 5/2/2019  XR CHEST PORTABLE 5/2/2019 5:00 PM HISTORY: Fall, chest pain COMPARISON: None. FINDINGS: Frontal view of the chest was obtained. The lungs are clear. The cardiomediastinal silhouette and pulmonary vascularity are within normal limits. The osseous structures and surrounding soft tissues demonstrate no acute abnormality. 1. No radiographic evidence of acute cardiopulmonary process.  Signed by Dr Mary Diallo on 5/2/2019 7:03

## 2019-05-04 VITALS
WEIGHT: 191.8 LBS | TEMPERATURE: 96.1 F | HEIGHT: 66 IN | RESPIRATION RATE: 14 BRPM | BODY MASS INDEX: 30.82 KG/M2 | DIASTOLIC BLOOD PRESSURE: 55 MMHG | HEART RATE: 74 BPM | SYSTOLIC BLOOD PRESSURE: 128 MMHG | OXYGEN SATURATION: 96 %

## 2019-05-04 LAB
ANION GAP SERPL CALCULATED.3IONS-SCNC: 13 MMOL/L (ref 7–19)
BANDED NEUTROPHILS RELATIVE PERCENT: 2 % (ref 0–5)
BASOPHILS ABSOLUTE: 0 K/UL (ref 0–0.2)
BASOPHILS RELATIVE PERCENT: 0 % (ref 0–1)
BUN BLDV-MCNC: 27 MG/DL (ref 8–23)
CALCIUM SERPL-MCNC: 8.4 MG/DL (ref 8.8–10.2)
CHLORIDE BLD-SCNC: 96 MMOL/L (ref 98–111)
CO2: 23 MMOL/L (ref 22–29)
CREAT SERPL-MCNC: 0.8 MG/DL (ref 0.5–0.9)
EOSINOPHILS ABSOLUTE: 0 K/UL (ref 0–0.6)
EOSINOPHILS RELATIVE PERCENT: 0 % (ref 0–5)
GFR NON-AFRICAN AMERICAN: >60
GLUCOSE BLD-MCNC: 113 MG/DL (ref 74–109)
HCT VFR BLD CALC: 26.2 % (ref 37–47)
HEMOGLOBIN: 8.7 G/DL (ref 12–16)
LYMPHOCYTES ABSOLUTE: 2.3 K/UL (ref 1.1–4.5)
LYMPHOCYTES RELATIVE PERCENT: 17 % (ref 20–40)
MCH RBC QN AUTO: 31 PG (ref 27–31)
MCHC RBC AUTO-ENTMCNC: 33.2 G/DL (ref 33–37)
MCV RBC AUTO: 93.2 FL (ref 81–99)
MONOCYTES ABSOLUTE: 1 K/UL (ref 0–0.9)
MONOCYTES RELATIVE PERCENT: 7 % (ref 0–10)
NEUTROPHILS ABSOLUTE: 10.3 K/UL (ref 1.5–7.5)
NEUTROPHILS RELATIVE PERCENT: 74 % (ref 50–65)
PDW BLD-RTO: 13.1 % (ref 11.5–14.5)
PLATELET # BLD: 190 K/UL (ref 130–400)
PLATELET SLIDE REVIEW: ADEQUATE
PMV BLD AUTO: 11.5 FL (ref 9.4–12.3)
POTASSIUM REFLEX MAGNESIUM: 4 MMOL/L (ref 3.5–5)
RBC # BLD: 2.81 M/UL (ref 4.2–5.4)
SODIUM BLD-SCNC: 132 MMOL/L (ref 136–145)
WBC # BLD: 13.6 K/UL (ref 4.8–10.8)

## 2019-05-04 PROCEDURE — 6370000000 HC RX 637 (ALT 250 FOR IP): Performed by: ORTHOPAEDIC SURGERY

## 2019-05-04 PROCEDURE — 2580000003 HC RX 258: Performed by: ORTHOPAEDIC SURGERY

## 2019-05-04 PROCEDURE — 99239 HOSP IP/OBS DSCHRG MGMT >30: CPT | Performed by: FAMILY MEDICINE

## 2019-05-04 PROCEDURE — 85025 COMPLETE CBC W/AUTO DIFF WBC: CPT

## 2019-05-04 PROCEDURE — 6360000002 HC RX W HCPCS: Performed by: ORTHOPAEDIC SURGERY

## 2019-05-04 PROCEDURE — 80048 BASIC METABOLIC PNL TOTAL CA: CPT

## 2019-05-04 PROCEDURE — 97116 GAIT TRAINING THERAPY: CPT

## 2019-05-04 PROCEDURE — 97161 PT EVAL LOW COMPLEX 20 MIN: CPT

## 2019-05-04 RX ORDER — VALSARTAN 160 MG/1
320 TABLET ORAL DAILY
Status: DISCONTINUED | OUTPATIENT
Start: 2019-05-04 | End: 2019-05-04 | Stop reason: HOSPADM

## 2019-05-04 RX ORDER — HYDROCHLOROTHIAZIDE 25 MG/1
25 TABLET ORAL DAILY
Status: DISCONTINUED | OUTPATIENT
Start: 2019-05-04 | End: 2019-05-04 | Stop reason: HOSPADM

## 2019-05-04 RX ORDER — OXYCODONE HYDROCHLORIDE AND ACETAMINOPHEN 5; 325 MG/1; MG/1
1 TABLET ORAL EVERY 4 HOURS PRN
Qty: 25 TABLET | Refills: 0 | Status: SHIPPED | OUTPATIENT
Start: 2019-05-04 | End: 2019-05-11

## 2019-05-04 RX ORDER — HYDRALAZINE HYDROCHLORIDE 25 MG/1
25 TABLET, FILM COATED ORAL 3 TIMES DAILY
Status: DISCONTINUED | OUTPATIENT
Start: 2019-05-04 | End: 2019-05-04 | Stop reason: HOSPADM

## 2019-05-04 RX ORDER — VALSARTAN AND HYDROCHLOROTHIAZIDE 320; 25 MG/1; MG/1
1 TABLET, FILM COATED ORAL DAILY
Status: DISCONTINUED | OUTPATIENT
Start: 2019-05-04 | End: 2019-05-04 | Stop reason: CLARIF

## 2019-05-04 RX ADMIN — VALSARTAN 320 MG: 160 TABLET, FILM COATED ORAL at 09:39

## 2019-05-04 RX ADMIN — ENOXAPARIN SODIUM 40 MG: 40 INJECTION SUBCUTANEOUS at 09:38

## 2019-05-04 RX ADMIN — HYDRALAZINE HYDROCHLORIDE 25 MG: 25 TABLET, FILM COATED ORAL at 16:07

## 2019-05-04 RX ADMIN — PREGABALIN 100 MG: 50 CAPSULE ORAL at 09:39

## 2019-05-04 RX ADMIN — OXYCODONE HYDROCHLORIDE AND ACETAMINOPHEN 1 TABLET: 5; 325 TABLET ORAL at 09:39

## 2019-05-04 RX ADMIN — CEFAZOLIN SODIUM 1 G: 1 INJECTION, SOLUTION INTRAVENOUS at 04:13

## 2019-05-04 RX ADMIN — FAMOTIDINE 20 MG: 20 TABLET ORAL at 09:39

## 2019-05-04 RX ADMIN — OXYCODONE HYDROCHLORIDE AND ACETAMINOPHEN 1 TABLET: 5; 325 TABLET ORAL at 18:41

## 2019-05-04 RX ADMIN — Medication 10 ML: at 09:40

## 2019-05-04 RX ADMIN — MORPHINE SULFATE 4 MG: 2 INJECTION, SOLUTION INTRAMUSCULAR; INTRAVENOUS at 11:11

## 2019-05-04 RX ADMIN — OXYCODONE HYDROCHLORIDE AND ACETAMINOPHEN 1 TABLET: 5; 325 TABLET ORAL at 16:07

## 2019-05-04 RX ADMIN — HYDROCHLOROTHIAZIDE 25 MG: 25 TABLET ORAL at 09:39

## 2019-05-04 RX ADMIN — HYDRALAZINE HYDROCHLORIDE 25 MG: 25 TABLET, FILM COATED ORAL at 09:39

## 2019-05-04 RX ADMIN — CEFAZOLIN SODIUM 1 G: 1 INJECTION, SOLUTION INTRAVENOUS at 09:40

## 2019-05-04 ASSESSMENT — PAIN SCALES - GENERAL
PAINLEVEL_OUTOF10: 0
PAINLEVEL_OUTOF10: 8
PAINLEVEL_OUTOF10: 4
PAINLEVEL_OUTOF10: 7
PAINLEVEL_OUTOF10: 6
PAINLEVEL_OUTOF10: 5
PAINLEVEL_OUTOF10: 4
PAINLEVEL_OUTOF10: 5

## 2019-05-04 NOTE — DISCHARGE INSTR - COC
Continuity of Care Form    Patient Name: Agustina Wilkins   :  1931  MRN:  [id number]    Admit date:  2019  Discharge date:  19    Code Status Order: Full Code   Advance Directives:   [facility] Documentation     Date/Time Healthcare Directive Type of Healthcare Directive Copy in [address]  [address] Agent's Name Healthcare Agent's Phone Number    [phone]  Yes, patient has an advance directive for healthcare treatment  Living will  No, copy requested from family  Healthcare power of   Kristan quintanilla  [phone]          Admitting Physician:  Alec Carey MD  PCP: Rafaela Patrick MD    Discharging Nurse: [address] [place] [address] Unit/Room#: 0503/503-01  Discharging Unit Phone Number: [phone]    Emergency Contact:   Extended Emergency Contact Information  Primary Emergency Contact: Kendra Penahouse  Address: [facility]  Home Phone: [phone]  Mobile Phone: [phone]  Relation: Niece/Nephew  Preferred language: English    Past Surgical History:  Past Surgical History:   Procedure Laterality Date    MASTECTOMY, PARTIAL Left     TONSILLECTOMY         Immunization History: There is no immunization history on file for this patient.     Active Problems:  Patient Active Problem List   Diagnosis Code    Closed fracture of right distal radius S52.501A    Closed nondisplaced fracture of proximal phalanx of left thumb S62.515A    Open wound of left lower leg S81.802A    Hypertension I10    Open displaced fracture of phalanx of right thumb S62.501B       Isolation/Infection:   Isolation          No Isolation            Nurse Assessment:  Last Vital Signs: BP (!) 128/55   Pulse 74   Temp 96.1 °F (35.6 °C) (Temporal)   Resp 14   Ht 5' 6\" (1.676 m)   Wt 191 lb 12.8 oz (87 kg)   SpO2 96%   BMI 30.96 kg/m²     Last documented pain score (0-10 scale): Pain Level: 4  Last Weight:   Wt Readings from Last 1 Encounters:   05/03/19 191 lb 12.8 oz (87 kg)     Mental Status:  oriented, alert, coherent and logical    IV Access:  - None    Nursing Mobility/ADLs:  Walking   Assisted  Transfer  Assisted  Bathing  Dependent  Dressing  Assisted  Toileting  Assisted  Feeding  Assisted  Med Admin  Assisted  Med Delivery   whole one at a time    Wound Care Documentation and Therapy:  Wound 05/03/19 Pretibial Left;Proximal LLE ulcer (Active)   Wound Other 5/3/2019  3:17 PM   Dressing Status Changed 5/3/2019  3:17 PM   Dressing Changed Changed/New 5/3/2019  3:17 PM   Dressing/Treatment Other (comment) 5/3/2019  3:17 PM   Wound Length (cm) 1 cm 5/3/2019  3:17 PM   Wound Width (cm) 1.3 cm 5/3/2019  3:17 PM   Wound Depth (cm) 0.1 cm 5/3/2019  3:17 PM   Wound Surface Area (cm^2) 1.3 cm^2 5/3/2019  3:17 PM   Wound Volume (cm^3) 0.13 cm^3 5/3/2019  3:17 PM   Red%Wound Bed 100 5/3/2019  3:17 PM   Culture Taken No 5/3/2019  3:17 PM   Number of days: 1        Elimination:  Continence:   · Bowel: Yes  · Bladder: Yes  Urinary Catheter: None   Colostomy/Ileostomy/Ileal Conduit: No       Date of Last BM: 05/02/19    Intake/Output Summary (Last 24 hours) at 5/4/2019 1726  Last data filed at 5/4/2019 1611  Gross per 24 hour   Intake 6173.25 ml   Output 400 ml   Net 5773.25 ml     I/O last 3 completed shifts: In: 5963.3 [P.O.:1460; I.V.:4503.3]  Out: 950 [Urine:950]    Safety Concerns:     History of Falls (last 30 days)    Impairments/Disabilities:      Hearing    Nutrition Therapy:  Current Nutrition Therapy:   - Oral Diet:  General    Routes of Feeding: Oral  Liquids: No Restrictions  Daily Fluid Restriction: no  Last Modified Barium Swallow with Video (Video Swallowing Test): not done    Treatments at the Time of Hospital Discharge:   Respiratory Treatments: none  Oxygen Therapy:  is not on home oxygen therapy.   Ventilator:    - No ventilator support    Rehab Therapies: Physical Therapy and Occupational Therapy  Weight Bearing Status/Restrictions: No weight bearing restirctions  Other Medical Equipment (for information only, NOT a DME order): none  Other Treatments:     Patient's personal belongings (please select all that are sent with patient):  {[facility] DME Belongings:[id number]}    RN SIGNATURE:  Electronically signed by Mingo Aldana RN on 5/4/19 at 5:34 PM    CASE MANAGEMENT/SOCIAL WORK SECTION    Inpatient Status Date: ***    Readmission Risk Assessment Score:  Readmission Risk              Risk of Unplanned Readmission:        14           Discharging to Facility/ Agency   · Name:   · Address:  · Phone:  · Fax:    Dialysis Facility (if applicable)   · Name:  · Address:  · Dialysis Schedule:  · Phone:  · Fax:    / signature: {Esignature:[id number]}    PHYSICIAN SECTION    Prognosis: {Prognosis:9653692004}    Condition at Discharge: Edenilson Cohen Patient Condition:[id number]}    Rehab Potential (if transferring to Rehab): {Prognosis:6271768717}    Recommended Labs or Other Treatments After Discharge: ***    Physician Certification: I certify the above information and transfer of Marbella Burgess  is necessary for the continuing treatment of the diagnosis listed and that she requires {Admit to Appropriate Level of Care:98891} for {GREATER/LESS:053885834} 30 days.      Update Admission H&P: {P DME Changes in EUTTM:757599638}    PHYSICIAN SIGNATURE:  {Esignature:[id number]}

## 2019-05-04 NOTE — ANESTHESIA POSTPROCEDURE EVALUATION
Department of Anesthesiology  Postprocedure Note    Patient: Nickolas Gonzalez  MRN: [id number]  YOB: 1931  Date of evaluation: 5/3/2019  Time:  8:55 PM     Procedure Summary     Date:  05/03/19 Room / Location:  [facility] OR  / [facility] OR    Anesthesia Start:  1731 Anesthesia Stop:      Procedures:       OPEN REDUCTION INTERNAL FIXATION RIGHT DISTAL RADIUS (Right Wrist)      PERCUTANEOUS PINNING RIGHT THUMB DISTAL PHALANX FRACTURE (Right Fingers) Diagnosis:  (FRACTURE RIGHT  DISTAL RADIUS)    Surgeon:  Richard Fuentes MD Responsible Provider:  Collette Norway, APRN - CRNA    Anesthesia Type:  general, regional ASA Status:  2          Anesthesia Type: general, regional    Carlota Phase I: Carlota Score: 10    Carlota Phase II:      Last vitals: Reviewed and per EMR flowsheets.        Anesthesia Post Evaluation    Patient location during evaluation: PACU  Patient participation: complete - patient participated  Level of consciousness: awake  Airway patency: patent  Nausea & Vomiting: no nausea and no vomiting  Complications: no  Cardiovascular status: hemodynamically stable  Respiratory status: nasal cannula  Hydration status: euvolemic

## 2019-05-04 NOTE — PROGRESS NOTES
Physical Therapy  Name: Lucidna Sabillon  MRN:  [id number]  Date of service:  5/4/2019 05/04/19 1604   Lower Extremity Weight Bearing Restrictions   Right Lower Extremity Weight Bearing Non Weight Bearing   Position Activity Restriction   Other position/activity restrictions has a sling on R UE she can wear for comfort   Subjective   Subjective pt [address] TO USE THE TOILET AND THEN AMB IN THE HALLWAY   General Comment   Comments pt HAS BEEN UP IN RECLINER FOR LUNCH   Transfers   Sit to Stand Contact guard assistance;Minimal Assistance   Stand to sit Contact guard assistance   Ambulation 1   Surface level tile   Device No Device   Other Apparatus   (GT BELT, SLING)   Assistance Contact guard assistance;Minimal assistance   Quality of Gait FLEXD POSTURE, SLOW, GUARDED, MILD LOB BW.   Distance 10 FT, 100 FT   Comments Pt BECAME MORE STEADY THE FURTHER SHE AMB. . DISCUSSED POSSIBILITY OF Pt USING A SC IN lL HAND DURING AMB AT [facility]. Balance   Comments pt CONTINUES TO EXPERINECE MILD LOB BW BUT THIS APPEASR TO BE IMPROVING WITH INC MOBILITY   Patient Goals    Patient goals  D/C TO AREA OF ASSISTED LIVING THAT PROVIDES SUPPORT FOR ALL MOBILITY/ADL'S   Short term goals   Time Frame for Short term goals 14 DAYS BEFORE RE EVAL   Short term goal 1 SUP<>SIT, SBA   Short term goal 2 SIT<>STAND,  CGA   Short term goal 3  FT WITH CGA   Conditions Requiring Skilled Therapeutic Intervention   Body structures, Functions, Activity limitations Decreased functional mobility ; Increased Pain;Decreased balance;Decreased endurance   Assessment pt'S GT/MOBILITYIMPROVING, BUT REMAINS AT RISK FOR A FALL AND REQUIRES ASSIST OF ONE   Patient Education ADVISED NIECE ON USE OF GT BELT AND POSSIBILITY OF TRYING A SC IN L HAND FOR AMB. ALSO DISCUSSED POSSIBLE NEED FOR PT EVAL AT [facility]. Discharge Recommendations Continue to assess pending progress; Patient would benefit from continued therapy after discharge   Activity Tolerance   Activity Tolerance Patient Tolerated treatment well   PT Equipment Recommendations   Other ASSESSING   Plan   Times per week PT AT LEAST ONCE DAILY X 14 DAYS   Current Treatment Recommendations Gait Training;Patient/Caregiver Education & Training;Pain Management; Functional Mobility Training;Positioning;Transfer Training; Safety Education & Training   Plan Comment SLING FOR R UE AS INDICATED BY PAIN .  MOBILITY TRAINING    Safety Devices   Type of devices Call light within reach;Gait belt;Left in chair         Electronically signed by Alecia Wells PT on 5/4/2019 at 4:15 PM

## 2019-05-04 NOTE — PROGRESS NOTES
Physical Therapy    Facility/Department: [facility] SURG SERVICES  Initial Assessment    NAME: Jannet Camejo  : 1931  MRN: [id number]    Date of Service: 2019    Discharge Recommendations:  Continue to assess pending progress, Patient would benefit from continued therapy after discharge   PT Equipment Recommendations  Other: ASSESSING    Assessment   Body structures, Functions, Activity limitations: Decreased functional mobility ; Increased Pain;Decreased balance;Decreased endurance  Assessment: pt MOTIVATED AND WORKING WELL WITH PT TO REGAIN MOBILITY POST FALL AND ORIF R UE. DUE TO ADVANCED AGE, RECENT FALL AND BEING UNSTEADY WITH AMB, pt IS AT RISK FOR A FALL AND PT ADVISES FOR pt TO HAVE CGA FOR ALL TF'S/AMB UNTIL SHE IS DEEMED SAFE TO RETURN TO IND MOBILITY  Prognosis: Good  Decision Making: Medium Complexity  Patient Education: FALL PREVENTION, POSITIONING FOR COMFORT  Barriers to Learning: [place]  REQUIRES PT FOLLOW UP: Yes  Activity Tolerance  Activity Tolerance: Patient Tolerated treatment well       Patient Diagnosis(es): The primary encounter diagnosis was Open displaced fracture of phalanx of right thumb, unspecified phalanx, initial encounter. A diagnosis of Closed fracture of distal end of right radius, unspecified fracture morphology, initial encounter was also pertinent to this visit. has a past medical history of Hypertension. has a past surgical history that includes Mastectomy, partial (Left) and Tonsillectomy.     Restrictions  Restrictions/Precautions  Restrictions/Precautions: Weight Bearing  Lower Extremity Weight Bearing Restrictions  Right Lower Extremity Weight Bearing: Non Weight Bearing  Position Activity Restriction  Other position/activity restrictions: has a sling on R UE she can wear for comfort  Vision/Hearing  Vision: Within Functional Limits  Hearing: Exceptions to [facility]  Hearing Exceptions: Bilateral hearing aid;Hard of hearing/hearing concerns(DOES NOT WANT TO WEAR HER HEARING AIDES DUE TO FEAR OF LOOSING THEM)     Subjective  General  Diagnosis: FALL WITH FX R WRIST/THUMB. ORIF 5-3 BY DR GIPSON  Follows Commands: Within Functional Limits  General Comment  Comments: pt HAS A SLING SHE WANTED TO WEAR DURING MOBILITY. Subjective  Subjective: pt STATES SHE IS HAVING PAIN CONTROL ISSUES BUT WOULD LIKE TO GET OOB FOR LUNCH AND SIT IN THE RECLINER. HAS TAKEN HER PAIN MEDS  Pain Screening  Patient Currently in Pain: Yes          Orientation  Orientation  Overall Orientation Status: Within Functional Limits  Social/Functional History  Social/Functional History  Lives With: Alone  Type of Home: Assisted living  Home Equipment: Cane  ADL Assistance: Independent  Ambulation Assistance: Independent  Transfer Assistance: Independent  Additional Comments: pt STATES SHE WAS IND AND ACTIVE PRIOR TO FALL  Cognition        Objective          AROM RLE (degrees)  RLE AROM: WFL  AROM LLE (degrees)  LLE AROM : WFL  Strength RLE  Strength RLE: WFL  Strength LLE  Strength LLE: WFL     Sensation  Overall Sensation Status: WFL(NIECE STATES SHE BELIEVES pt HAS NEUROPATHY BUT WILL NOT ADMIT IT)  Bed mobility  Supine to Sit: Minimal assistance; Moderate assistance  Transfers  Sit to Stand: Minimal Assistance  Stand to sit: Contact guard assistance;Minimal Assistance  Bed to Chair: Minimal assistance  Ambulation  Ambulation?: Yes  Ambulation 1  Surface: level tile  Device: No Device  Other Apparatus: (GT BELT, R UE SLING)  Assistance: Minimal assistance  Quality of Gait: FLEXED POSUURE, UNSTEADY AND LEANING BW, SHORT/SHUFFLING STEPS  Distance: 10 FT  Comments: pt WOULD LIKE TO EAT HER LUCH AND AMB FURTHER     Balance  Sitting - Static: Good;-  Sitting - Dynamic: Good;-  Standing - Static: Fair  Standing - Dynamic: Fair;-  Comments: pt C/O FEELING UNSTEADY DUE TO NOT BEING OUT OF BED IN TWO DAYS. NOTED pt TO EXPERIENCE LOB BW WITH INITIAL SIT TO STAND.          Plan   Plan  Times per week: PT AT LEAST ONCE DAILY X 14 DAYS  Current Treatment Recommendations: Gait Training, Patient/Caregiver Education & Training, Pain Management, Functional Mobility Training, Positioning, Transfer Training, Safety Education & Training  Plan Comment: SLING FOR R UE AS INDICATED BY PAIN .  MOBILITY TRAINING   Safety Devices  Type of devices: Call light within reach, Gait belt, Left in chair(NIECE STAYING WITH Pt)    G-Code       OutComes Score                                                  AM-PAC Score             Goals  Short term goals  Time Frame for Short term goals: 14 DAYS BEFORE RE EVAL  Short term goal 1: SUP<>SIT, SBA  Short term goal 2: SIT<>STAND,  CGA  Short term goal 3:  FT WITH CGA  Patient Goals   Patient goals : D/C TO AREA OF ASSISTED LIVING THAT PROVIDES SUPPORT FOR ALL MOBILITY/ADL'S       Therapy Time   Individual Concurrent Group Co-treatment   Time In           Time Out           Minutes                   Michi Gramajo PT     Electronically signed by Michi Gramajo PT on 5/4/2019 at 3:52 PM

## 2019-05-04 NOTE — OP NOTE
Patient Name: Josselyn Li  : 1931  MRN: [id number]    DATE of SURGERY: 5/3/2019    SURGEON: Betsy Green MD    ASSISTANT: None    PREOPERATIVE DIAGNOSIS:   1. Right intra-articular distal radius fracture, carpal subluxation  2. Right thumb intra-articular distal phalanx fracture  3. Hypertension     POSTOPERATIVE DIAGNOSIS:    1. Right intra-articular distal radius fracture, carpal subluxation  2. Right thumb intra-articular distal phalanx fracture  3. Hypertension     PROCEDURE PERFORMED:    1. ORIF right intra-articular distal radius fracture with carpal subluxation  2. Closed reduction and percutaneous pinning of right thumb intra-articular distal phalanx fracture  3. Exam under anesthesia of right wrist     IMPLANTS:  Synthes 2.4mm VA volar distal radius plate, 5.353 K-wire.     ANESTHESIA USED:  General with regional.     OPERATIVE INDICATIONS:   Ms. Shelly Dale is an 60-year-old female who presented to the emergency room yesterday after a mechanical fall. She had onset pain and deformity to her right wrist, as well as the left side of her face. Radiographs in the emergency room revealed a right volar Joshua Fendt distal radius fracture, right thumb intra-articular distal phalanx fracture, left thumb proximal phalanx fracture and left-sided facial fractures. Orthopedics was consult for management of her extremity fractures. I had a lengthy discussion with Ms. Cm and her family regarding her clinical and radiographic findings. Due to the nature of the right distal radius fracture, her activity level and independence and overall health, I recommended operative intervention. We did discuss both conservative and operative options. Risks and benefits were discussed. I also discussed evaluating her right thumb intra-articular distal phalanx fracture intraoperatively and potentially pinning this if the interphalangeal joint was unstable.   In regard to the left thumb, I recommended conservative the FCR tendon which was difficult to palpate secondary to edema. The right upper extremity was then exsanguinated with an Esmarch and the tourniquet was inflated to 250 mmHg for approximately 95 minutes. A 15 blade scalpel was used to incise through the skin only. Blunt dissection was then carried down onto the volar portion of the FCR tendon. A deep 15 blade scalpel was use to incise the tendon sheath and the FCR tendon was retracted ulnarly. On inspection of the dorsal portion of the FCR tendon sheath, the palmar cutaneous nerve pierced the dorsal portion of the tendon sheath. This was protected as the dorsal tendon sheath was opened sharply. Blunt dissection was carried down onto the distal radius and the FPL was retracted radially. The pronator quadratus was identified and elevated in an ulnar direction exposing the distal radius. The fracture site was identified. There was a longitudinal intra-articular split through the volar portion of the distal radius. The fracture site was irrigated and cleansed of hematoma. The brachioradialis was elevated off the radial styloid with care to protect the first dorsal compartment. A reduction was then obtained using longitudinal traction and pressure over the volar portion of the articular segment. I was able to obtain improved length had difficulty restoring radial translation of the articular fragment. A lobster claw clamp was then applied to the proximal shaft and used to manipulate and reduce the shaft to the articular segment. Distal radius plate was then placed and pinned into position. Fluoroscopy was then brought in and showed improved alignment but proximal positioning of the plate. Therefore, the wires were removed and the plate was positioned more distally. After repositioning the plate, fluoroscopy was brought in which indicated improved plate placement. I then placed a proximal cortical screw with the intent to buttress the distal fragment. Repeat fluoroscopic imaging revealed improved alignment. Distal locking screws were then placed. While placing the proximal radial column screw, the volar cortex fragment. I left the screw in place in a locked position with the hopes to buttress the distal articular surface. I did not place the proximal ulnar column locking screw due to concern for fragmentation. Fluoroscopic images were then obtained in multiple planes to ensure adequate hardware placement with extra-articular distal screw placement, which was confirmed. Under live fluoroscopic imaging, wrist was taken through active range of motion; I also stressed the carpus in a dorsal to volar direction. The carpus remained reduced with no evidence of subluxation or dislocation. The wound was irrigated with normal saline and a damp lap sponge was placed in the wound. Attention was then turned to the right thumb which was evaluated under fluoroscopy. The joint appeared to be dorsally subluxed. Joint alignment was improved with manual reduction. Therefore, I decided to proceed with closed reduction percutaneous pinning. With manual reduction maintained, a 0.045 K wire was driven retrograde through the distal phalanx into the proximal phalangeal base of the thumb. Repeat AP and lateral fluoroscopic images revealed improved alignment. K wire was then cut and a Jurgan ball was placed over the tip. The tourniquet was deflated and hemostasis was obtained with pressure and electrocautery. The wound was then again irrigated with normal saline. 2-0 Vicryl sutures were used to reapproximate the subcutaneous tissue. 3-0 simple and horizontal mattress sutures were used to reapproximate the skin edges. She had very thin skin and with any tension during closure suffered skin tears. A thumb spica splint was then applied consisting of Betadine-soaked Adaptic, 4 x 4's, web roll, plaster and an Ace bandage were applied.   She was awoken from anesthesia,

## 2019-05-04 NOTE — DISCHARGE SUMMARY
Hospitalist   Discharge Summary    Mona Beltran  :  1931  MRN:  [id number]    Admit date:  2019  Discharge date:  2019    Admitting Physician:  Migdalia Bennett MD    Advance Directive: Full Code    Consults: orthopedic surgery    Primary Care Physician:  Lilian Hankins MD    Discharge Diagnoses:  Principal Problem:    Closed fracture of right distal radius  Active Problems:    Closed nondisplaced fracture of proximal phalanx of left thumb    Open wound of left lower leg    Hypertension    Open displaced fracture of phalanx of right thumb  Resolved Problems:    * No resolved hospital problems. *      Significant Diagnostic Studies:   Xr Radius Ulna Right (2 Views)    Result Date: 2019  XR WRIST RIGHT (MIN 3 VIEWS), XR RADIUS ULNA RIGHT (2 VIEWS), XR HAND RIGHT (MIN 3 VIEWS) 2019 4:00 PM HISTORY: Patient fell and complains of right arm pain COMPARISON: None. Findings: 3 views of the right wrist, 3 views of the right hand, and 2 views of the right forearm were obtained. There is an acute, moderately to markedly displaced fracture through the distal right radius. This is comminuted and extends into the joint space. The distal fragment is displaced by one shaft width in the plantar direction. There is no evidence of carpal dislocation. Degenerative changes are seen in the distal radioulnar joint, triscaphe joint, and 1st carpometacarpal joint. There is no other fractures are seen in the radius or ulna. There is an acute, comminuted fracture through the base of the 1st distal phalanx. This extends into the interphalangeal joint, and there is moderate articular offset. Degenerative changes are seen in the interphalangeal joints. 1. Markedly displaced fracture through the distal right radial metaphysis with intra-articular components and approximately 2.4 cm of the lumbar displacement. 2. Acute, intra-articular, comminuted fracture through the base of the 1st distal phalanx.  3. Moderate degenerative changes in the hand and wrist. Signed by Dr Ryan Williamson on 5/2/2019 5:33 PM    Xr Wrist Right (2 Views)    Result Date: 5/2/2019  XR WRIST RIGHT (MIN 3 VIEWS) 5/2/2019 6:45 PM HISTORY: Postreduction of the right wrist fracture COMPARISON: 5/2/2019 at 5:06 PM    2 views of the right wrist demonstrate no significant improvement in the severely displaced fracture through the distal radial metaphysis and volar displacement by approximately one shaft width. There is questionable ulnocarpal dislocation that was not appreciated on the previous exam. Signed by Dr Ryan Williamson on 5/2/2019 8:30 PM    Xr Wrist Right (min 3 Views)    Result Date: 5/3/2019  XR WRIST RIGHT (MIN 3 VIEWS) 5/3/2019 9:04 PM History: Traumatic injury with fractures. Postoperative internal fixation. Portable postoperative radiograph show pin fixation of the first interphalangeal joint and plate and screw fixation of the distal radius. Anatomic alignment is present. Signed by Dr Joceline Boo on 5/3/2019 9:04 PM    Xr Wrist Right (min 3 Views)    Result Date: 5/3/2019  XR WRIST RIGHT (MIN 3 VIEWS) 5/3/2019 8:21 PM History: Wrist fracture. Open reduction internal fixation. Fluoroscopy dose = 0.74546 mGym2. 4 images document placement and screw fixation of the distal right radius. Signed by Dr Joceline Boo on 5/3/2019 8:21 PM    Xr Wrist Right (min 3 Views)    Result Date: 5/2/2019  XR WRIST RIGHT (MIN 3 VIEWS), XR RADIUS ULNA RIGHT (2 VIEWS), XR HAND RIGHT (MIN 3 VIEWS) 5/2/2019 4:00 PM HISTORY: Patient fell and complains of right arm pain COMPARISON: None. Findings: 3 views of the right wrist, 3 views of the right hand, and 2 views of the right forearm were obtained. There is an acute, moderately to markedly displaced fracture through the distal right radius. This is comminuted and extends into the joint space. The distal fragment is displaced by one shaft width in the plantar direction.  There is no evidence of carpal dislocation. Degenerative changes are seen in the distal radioulnar joint, triscaphe joint, and 1st carpometacarpal joint. There is no other fractures are seen in the radius or ulna. There is an acute, comminuted fracture through the base of the 1st distal phalanx. This extends into the interphalangeal joint, and there is moderate articular offset. Degenerative changes are seen in the interphalangeal joints. 1. Markedly displaced fracture through the distal right radial metaphysis with intra-articular components and approximately 2.4 cm of the lumbar displacement. 2. Acute, intra-articular, comminuted fracture through the base of the 1st distal phalanx. 3. Moderate degenerative changes in the hand and wrist. Signed by Dr William Murrell on 5/2/2019 5:33 PM    Xr Hand Right (min 3 Views)    Result Date: 5/2/2019  XR WRIST RIGHT (MIN 3 VIEWS), XR RADIUS ULNA RIGHT (2 VIEWS), XR HAND RIGHT (MIN 3 VIEWS) 5/2/2019 4:00 PM HISTORY: Patient fell and complains of right arm pain COMPARISON: None. Findings: 3 views of the right wrist, 3 views of the right hand, and 2 views of the right forearm were obtained. There is an acute, moderately to markedly displaced fracture through the distal right radius. This is comminuted and extends into the joint space. The distal fragment is displaced by one shaft width in the plantar direction. There is no evidence of carpal dislocation. Degenerative changes are seen in the distal radioulnar joint, triscaphe joint, and 1st carpometacarpal joint. There is no other fractures are seen in the radius or ulna. There is an acute, comminuted fracture through the base of the 1st distal phalanx. This extends into the interphalangeal joint, and there is moderate articular offset. Degenerative changes are seen in the interphalangeal joints. 1. Markedly displaced fracture through the distal right radial metaphysis with intra-articular components and approximately 2.4 cm of the lumbar displacement.  2. Acute, intra-articular, comminuted fracture through the base of the 1st distal phalanx. 3. Moderate degenerative changes in the hand and wrist. Signed by Dr Mario Ascencio on 5/2/2019 5:33 PM    Xr Hip Bilateral W Ap Pelvis (2 Views)    Result Date: 5/2/2019  XR HIP BILATERAL W AP PELVIS (2 VIEWS) 5/2/2019 5:00 PM HISTORY: Fall, bilateral hip pain COMPARISON: None FINDINGS: Frontal view of the pelvis and frontal and lateral views of bilateral hips were obtained. There is no fracture or dislocation. Moderate to marked joint space narrowing is seen in both hips. A calcified uterine fibroid is noted. 1. Moderate to marked degenerative changes in the hips. No fracture. Signed by Dr Mario Ascencio on 5/2/2019 7:04 PM    Xr Knee Left (1-2 Views)    Result Date: 5/2/2019  XR TIBIA FIBULA LEFT (2 VIEWS), XR KNEE LEFT (1-2 VIEWS) 5/2/2019 5:15 PM HISTORY: Left leg pain and knee pain after a fall COMPARISON: NONE FINDINGS: Frontal and lateral radiographs of the left lower leg were obtained. Frontal and lateral radiographs of the left knee were also obtained. A compression stocking is present. The bones are intact. Moderate degenerative changes are seen in the left knee joint. The soft tissues are grossly unremarkable. 1. No acute bony abnormality in the left knee or lower leg. 2. Moderate degenerative changes in the left knee joint. Signed by Dr Mario Ascencio on 5/2/2019 7:06 PM    Xr Tibia Fibula Left (2 Views)    Result Date: 5/2/2019  XR TIBIA FIBULA LEFT (2 VIEWS), XR KNEE LEFT (1-2 VIEWS) 5/2/2019 5:15 PM HISTORY: Left leg pain and knee pain after a fall COMPARISON: NONE FINDINGS: Frontal and lateral radiographs of the left lower leg were obtained. Frontal and lateral radiographs of the left knee were also obtained. A compression stocking is present. The bones are intact. Moderate degenerative changes are seen in the left knee joint. The soft tissues are grossly unremarkable.     1. No acute bony abnormality in the TECHNIQUE: Serial helical tomographic images of the facial bones were obtained without the use of intravenous contrast. Additionally, multiplanar reformats in the axial and coronal planes were also obtained. FINDINGS: There is and acute, depressed fracture through the anterior wall of the left maxillary sinus. An associated air-fluid level is present. There is an acute fracture through the left nasal bone that is nondisplaced. No other facial bone fractures are seen. The remaining sinuses are clear. The mastoid air cells are clear. Moderate soft tissue swelling is seen in the left cheek. 1. Acute, comminuted and depressed fracture through the anterior wall of the left maxillary sinus with associated hemorrhage in the left maxillary sinus. 2. Minimally displaced, acute fracture through the left nasal bone. Signed by Dr Ronit Armendariz on 5/2/2019 5:38 PM    Ct Cervical Spine Wo Contrast    Result Date: 5/2/2019  CT CERVICAL SPINE WO CONTRAST 5/2/2019 3:45 PM HISTORY: Neck pain following trauma COMPARISON: None DLP: 469 mGy cm. In order to have a CT radiation dose as low as reasonably achievable, Automated Exposure Control was utilized for adjustment of the mA and/or KV according to patient size. TECHNIQUE: Serial helical tomographic images of the cervical spine were obtained without the use of intravenous contrast. Additionally, sagittal and coronal reformatted images were also provided for review. FINDINGS: Multilevel degenerative changes are seen in the cervical spine. There is no evidence of acute fracture or subluxation. The prevertebral soft tissues are within normal limits. The posterior elements are intact. Vertebral body heights are maintained. The visualized skull base is intact. The visualized thorax demonstrates no acute abnormality. 1. Degenerative changes in the cervical spine without evidence of acute osseous injury.  Signed by Dr Ronit Armendariz on 5/2/2019 5:42 PM    Ct Lumbar Spine Wo Contrast    Result Date: 5/2/2019  CT LUMBAR SPINE WO CONTRAST 5/2/2019 3:45 PM HISTORY: Back pain after a fall. COMPARISON: None DLP: 1585 mGy cm. In order to have a CT radiation dose as low as reasonably achievable, Automated Exposure Control was utilized for adjustment of the mA and/or KV according to patient size. TECHNIQUE: Serial helical tomographic images of the lumbar spine were obtained without the use of intravenous contrast. Additionally, sagittal and coronal reformatted images were provided for review. FINDINGS: There is no evidence of fracture or subluxation. Normal lumbar lordosis is maintained. Multilevel degenerative changes are seen. Vertebral body heights are preserved. The visualized soft tissues are unremarkable. 1. Multilevel degenerative changes in the lumbar spine without evidence of acute fracture or subluxation. Signed by Dr Starr Shabazz on 5/2/2019 5:45 PM    Xr Finger Left (min 2 Views)    Result Date: 5/2/2019  XR FINGER LEFT (MIN 2 VIEWS) 5/2/2019 6:45 PM HISTORY: Left thumb pain COMPARISON: None. 3 views of the left thumb were obtained. There is an acute, nondisplaced fracture through the base of the 1st proximal phalanx. No articular disruption is identified. Severe degenerative changes are present in the 1st carpometacarpal joint. Signed by Dr Starr Shabazz on 5/2/2019 8:29 PM    Ct Wrist Right Wo Contrast    Result Date: 5/3/2019  CT WRIST RIGHT WO CONTRAST 5/3/2019 1:59 PM History: Fall injury. Wrist fracture. Axial, sagittal, coronal, and three-dimensional CT imaging of the right wrist. In order to have a CT radiation dose as low as reasonably achievable Automated Exposure Control was utilized for adjustment of the mA and/or KV according to patient size. DLP in mGycm= 418. Questionable tiny nondisplaced cortical fracture at the tip of the ulna. No displaced fracture is seen. There is a comminuted displaced fracture of the distal right radius.  The ulnar half of the distal tenderness/mass/nodules  Lungs: clear to auscultation bilaterally  Heart: regular rate and rhythm, S1, S2 normal, no murmur, click, rub or gallop  Abdomen: soft, non-tender; bowel sounds normal; no masses,  no organomegaly  Extremities: Right forearm in sling. Neurologic: Mental status: Alert, oriented, thought content appropriate    Discharge Medications:       Bar Perdue   Home Medication Instructions IRD:[id number]    Printed on:05/04/19 3490   Medication Information                      aspirin 81 MG EC tablet  Take 81 mg by mouth daily             hydrALAZINE (APRESOLINE) 25 MG tablet  Take 25 mg by mouth 3 times daily             omeprazole (PRILOSEC) 40 MG delayed release capsule  Take 40 mg by mouth daily             oxyCODONE-acetaminophen (PERCOCET) 5-325 MG per tablet  Take 1 tablet by mouth every 4 hours as needed for Pain for up to 25 doses. pregabalin (LYRICA) 100 MG capsule  Take 100 mg by mouth 2 times daily. valsartan-hydrochlorothiazide (DIOVAN-HCT) 320-25 MG per tablet  Take 1 tablet by mouth daily                 Discharge Instructions: Follow up with Betsey Sawant MD in 7 days. Take medications as directed. Resume activity as tolerated. Diet: DIET GENERAL;     Disposition: Patient is medically stable and will be discharged home to assisted living. Time spent on discharge 33 minutes.     Signed:  Joe Perdomo MD  Hospitalist service  5/4/2019  4:59 PM

## 2019-05-07 LAB
EKG P AXIS: 67 DEGREES
EKG P-R INTERVAL: 238 MS
EKG Q-T INTERVAL: 394 MS
EKG QRS DURATION: 150 MS
EKG QTC CALCULATION (BAZETT): 429 MS
EKG T AXIS: 5 DEGREES

## 2019-05-20 ENCOUNTER — HOSPITAL ENCOUNTER (OUTPATIENT)
Dept: CT IMAGING | Age: 84
Discharge: HOME | End: 2019-05-20
Payer: MEDICARE

## 2019-05-20 DIAGNOSIS — S52.571A CLOSED DIE PUNCH FRACTURE DISTAL RADIUS, RIGHT, INITIAL ENCOUNTER: ICD-10-CM

## 2019-05-20 DIAGNOSIS — M25.531 RIGHT WRIST PAIN: ICD-10-CM

## 2019-05-20 PROCEDURE — 73200 CT UPPER EXTREMITY W/O DYE: CPT

## 2019-12-16 ENCOUNTER — HOSPITAL ENCOUNTER (INPATIENT)
Age: 84
LOS: 3 days | Discharge: NURSING, REHAB OR LONG-TERM CARE | DRG: 064 | End: 2019-12-19
Attending: HOSPITALIST | Admitting: INTERNAL MEDICINE
Payer: MEDICARE

## 2019-12-16 DIAGNOSIS — M11.20 CHONDROCALCINOSIS: Primary | ICD-10-CM

## 2019-12-16 DIAGNOSIS — I62.9 ACUTE INTRA-CRANIAL HEMORRHAGE (HCC): ICD-10-CM

## 2019-12-16 LAB
ALBUMIN SERPL-MCNC: 3.3 G/DL (ref 3.5–5.2)
ALP BLD-CCNC: 63 U/L (ref 35–104)
ALT SERPL-CCNC: 11 U/L (ref 5–33)
ANION GAP SERPL CALCULATED.3IONS-SCNC: 15 MMOL/L (ref 7–19)
APTT: 28.9 SEC (ref 26–36.2)
AST SERPL-CCNC: 16 U/L (ref 5–32)
BASOPHILS ABSOLUTE: 0 K/UL (ref 0–0.2)
BASOPHILS RELATIVE PERCENT: 0.3 % (ref 0–1)
BILIRUB SERPL-MCNC: 0.3 MG/DL (ref 0.2–1.2)
BUN BLDV-MCNC: 36 MG/DL (ref 8–23)
C-REACTIVE PROTEIN: 21.42 MG/DL (ref 0–0.5)
CALCIUM SERPL-MCNC: 9.2 MG/DL (ref 8.8–10.2)
CHLORIDE BLD-SCNC: 103 MMOL/L (ref 98–111)
CO2: 23 MMOL/L (ref 22–29)
CREAT SERPL-MCNC: 1.1 MG/DL (ref 0.5–0.9)
EOSINOPHILS ABSOLUTE: 0 K/UL (ref 0–0.6)
EOSINOPHILS RELATIVE PERCENT: 0 % (ref 0–5)
GFR NON-AFRICAN AMERICAN: 47
GLUCOSE BLD-MCNC: 152 MG/DL (ref 74–109)
HCT VFR BLD CALC: 32.4 % (ref 37–47)
HEMOGLOBIN: 10.6 G/DL (ref 12–16)
IMMATURE GRANULOCYTES #: 0.1 K/UL
INR BLD: 1.3 (ref 0.88–1.18)
LYMPHOCYTES ABSOLUTE: 1.2 K/UL (ref 1.1–4.5)
LYMPHOCYTES RELATIVE PERCENT: 10.5 % (ref 20–40)
MCH RBC QN AUTO: 30.1 PG (ref 27–31)
MCHC RBC AUTO-ENTMCNC: 32.7 G/DL (ref 33–37)
MCV RBC AUTO: 92 FL (ref 81–99)
MONOCYTES ABSOLUTE: 1.3 K/UL (ref 0–0.9)
MONOCYTES RELATIVE PERCENT: 12 % (ref 0–10)
NEUTROPHILS ABSOLUTE: 8.4 K/UL (ref 1.5–7.5)
NEUTROPHILS RELATIVE PERCENT: 76.7 % (ref 50–65)
PDW BLD-RTO: 13.8 % (ref 11.5–14.5)
PLATELET # BLD: 237 K/UL (ref 130–400)
PMV BLD AUTO: 11.8 FL (ref 9.4–12.3)
POTASSIUM REFLEX MAGNESIUM: 3.8 MMOL/L (ref 3.5–5)
PROTHROMBIN TIME: 15.5 SEC (ref 12–14.6)
RBC # BLD: 3.52 M/UL (ref 4.2–5.4)
SEDIMENTATION RATE, ERYTHROCYTE: 121 MM/HR (ref 0–25)
SODIUM BLD-SCNC: 141 MMOL/L (ref 136–145)
TOTAL CK: 137 U/L (ref 26–192)
TOTAL PROTEIN: 7.6 G/DL (ref 6.6–8.7)
WBC # BLD: 10.9 K/UL (ref 4.8–10.8)

## 2019-12-16 PROCEDURE — 82550 ASSAY OF CK (CPK): CPT

## 2019-12-16 PROCEDURE — 85730 THROMBOPLASTIN TIME PARTIAL: CPT

## 2019-12-16 PROCEDURE — 85025 COMPLETE CBC W/AUTO DIFF WBC: CPT

## 2019-12-16 PROCEDURE — 85652 RBC SED RATE AUTOMATED: CPT

## 2019-12-16 PROCEDURE — 99222 1ST HOSP IP/OBS MODERATE 55: CPT | Performed by: NEUROLOGICAL SURGERY

## 2019-12-16 PROCEDURE — 80053 COMPREHEN METABOLIC PANEL: CPT

## 2019-12-16 PROCEDURE — 87081 CULTURE SCREEN ONLY: CPT

## 2019-12-16 PROCEDURE — 86140 C-REACTIVE PROTEIN: CPT

## 2019-12-16 PROCEDURE — 85610 PROTHROMBIN TIME: CPT

## 2019-12-16 PROCEDURE — 6370000000 HC RX 637 (ALT 250 FOR IP): Performed by: INTERNAL MEDICINE

## 2019-12-16 PROCEDURE — 2580000003 HC RX 258: Performed by: INTERNAL MEDICINE

## 2019-12-16 PROCEDURE — 2000000000 HC ICU R&B

## 2019-12-16 PROCEDURE — 36415 COLL VENOUS BLD VENIPUNCTURE: CPT

## 2019-12-16 PROCEDURE — 2500000003 HC RX 250 WO HCPCS: Performed by: INTERNAL MEDICINE

## 2019-12-16 RX ORDER — LABETALOL 20 MG/4 ML (5 MG/ML) INTRAVENOUS SYRINGE
10 EVERY 6 HOURS PRN
Status: DISCONTINUED | OUTPATIENT
Start: 2019-12-16 | End: 2019-12-19 | Stop reason: HOSPADM

## 2019-12-16 RX ORDER — ONDANSETRON 2 MG/ML
4 INJECTION INTRAMUSCULAR; INTRAVENOUS EVERY 6 HOURS PRN
Status: DISCONTINUED | OUTPATIENT
Start: 2019-12-16 | End: 2019-12-19 | Stop reason: HOSPADM

## 2019-12-16 RX ORDER — METOPROLOL TARTRATE 50 MG/1
50 TABLET, FILM COATED ORAL 2 TIMES DAILY
Status: DISCONTINUED | OUTPATIENT
Start: 2019-12-17 | End: 2019-12-19 | Stop reason: HOSPADM

## 2019-12-16 RX ORDER — ACETAMINOPHEN 325 MG/1
650 TABLET ORAL EVERY 6 HOURS PRN
Status: DISCONTINUED | OUTPATIENT
Start: 2019-12-16 | End: 2019-12-19 | Stop reason: HOSPADM

## 2019-12-16 RX ORDER — METOPROLOL TARTRATE 50 MG/1
50 TABLET, FILM COATED ORAL 2 TIMES DAILY
COMMUNITY

## 2019-12-16 RX ORDER — PANTOPRAZOLE SODIUM 40 MG/1
40 TABLET, DELAYED RELEASE ORAL
Status: DISCONTINUED | OUTPATIENT
Start: 2019-12-17 | End: 2019-12-19 | Stop reason: HOSPADM

## 2019-12-16 RX ORDER — VALSARTAN AND HYDROCHLOROTHIAZIDE 320; 25 MG/1; MG/1
1 TABLET, FILM COATED ORAL DAILY
Status: DISCONTINUED | OUTPATIENT
Start: 2019-12-17 | End: 2019-12-16

## 2019-12-16 RX ORDER — POTASSIUM CHLORIDE 20 MEQ/1
40 TABLET, EXTENDED RELEASE ORAL PRN
Status: DISCONTINUED | OUTPATIENT
Start: 2019-12-16 | End: 2019-12-19 | Stop reason: HOSPADM

## 2019-12-16 RX ORDER — HYDRALAZINE HYDROCHLORIDE 25 MG/1
25 TABLET, FILM COATED ORAL 3 TIMES DAILY
Status: DISCONTINUED | OUTPATIENT
Start: 2019-12-16 | End: 2019-12-19

## 2019-12-16 RX ORDER — POTASSIUM CHLORIDE 7.45 MG/ML
10 INJECTION INTRAVENOUS PRN
Status: DISCONTINUED | OUTPATIENT
Start: 2019-12-16 | End: 2019-12-19 | Stop reason: HOSPADM

## 2019-12-16 RX ORDER — SODIUM CHLORIDE 0.9 % (FLUSH) 0.9 %
10 SYRINGE (ML) INJECTION PRN
Status: DISCONTINUED | OUTPATIENT
Start: 2019-12-16 | End: 2019-12-17

## 2019-12-16 RX ORDER — SODIUM CHLORIDE 0.9 % (FLUSH) 0.9 %
10 SYRINGE (ML) INJECTION EVERY 12 HOURS SCHEDULED
Status: DISCONTINUED | OUTPATIENT
Start: 2019-12-16 | End: 2019-12-17

## 2019-12-16 RX ORDER — LABETALOL HYDROCHLORIDE 5 MG/ML
10 INJECTION, SOLUTION INTRAVENOUS EVERY 6 HOURS PRN
Status: DISCONTINUED | OUTPATIENT
Start: 2019-12-16 | End: 2019-12-16

## 2019-12-16 RX ORDER — HYDROCHLOROTHIAZIDE 25 MG/1
25 TABLET ORAL DAILY
Status: DISCONTINUED | OUTPATIENT
Start: 2019-12-17 | End: 2019-12-19 | Stop reason: HOSPADM

## 2019-12-16 RX ORDER — VALSARTAN 160 MG/1
320 TABLET ORAL DAILY
Status: DISCONTINUED | OUTPATIENT
Start: 2019-12-17 | End: 2019-12-19 | Stop reason: HOSPADM

## 2019-12-16 RX ADMIN — SODIUM CHLORIDE, PRESERVATIVE FREE 10 ML: 5 INJECTION INTRAVENOUS at 20:24

## 2019-12-16 RX ADMIN — LABETALOL 20 MG/4 ML (5 MG/ML) INTRAVENOUS SYRINGE 10 MG: at 18:33

## 2019-12-16 RX ADMIN — HYDRALAZINE HYDROCHLORIDE 25 MG: 25 TABLET, FILM COATED ORAL at 20:23

## 2019-12-16 ASSESSMENT — PAIN SCALES - GENERAL
PAINLEVEL_OUTOF10: 0

## 2019-12-16 ASSESSMENT — ENCOUNTER SYMPTOMS
RESPIRATORY NEGATIVE: 1
EYES NEGATIVE: 1
GASTROINTESTINAL NEGATIVE: 1

## 2019-12-16 ASSESSMENT — PAIN SCALES - WONG BAKER: WONGBAKER_NUMERICALRESPONSE: 0

## 2019-12-17 ENCOUNTER — APPOINTMENT (OUTPATIENT)
Dept: CT IMAGING | Age: 84
DRG: 064 | End: 2019-12-17
Attending: HOSPITALIST
Payer: MEDICARE

## 2019-12-17 PROBLEM — E87.6 HYPOKALEMIA: Status: ACTIVE | Noted: 2019-12-17

## 2019-12-17 PROBLEM — M11.20 CHONDROCALCINOSIS: Status: ACTIVE | Noted: 2019-12-17

## 2019-12-17 LAB
ANION GAP SERPL CALCULATED.3IONS-SCNC: 17 MMOL/L (ref 7–19)
APPEARANCE FLUID: NORMAL
BACTERIA: NEGATIVE /HPF
BASOPHILS ABSOLUTE: 0.1 K/UL (ref 0–0.2)
BASOPHILS RELATIVE PERCENT: 0.6 % (ref 0–1)
BILIRUBIN URINE: NEGATIVE
BLOOD, URINE: NEGATIVE
BUN BLDV-MCNC: 41 MG/DL (ref 8–23)
CALCIUM SERPL-MCNC: 9 MG/DL (ref 8.8–10.2)
CELL COUNT FLUID TYPE: NORMAL
CHLORIDE BLD-SCNC: 101 MMOL/L (ref 98–111)
CLARITY: ABNORMAL
CLOT EVALUATION: NORMAL
CO2: 20 MMOL/L (ref 22–29)
COLOR FLUID: YELLOW
COLOR: YELLOW
CREAT SERPL-MCNC: 1 MG/DL (ref 0.5–0.9)
EOSINOPHIL FLUID: 1 %
EOSINOPHILS ABSOLUTE: 0 K/UL (ref 0–0.6)
EOSINOPHILS RELATIVE PERCENT: 0.2 % (ref 0–5)
EPITHELIAL CELLS, UA: 3 /HPF (ref 0–5)
FERRITIN: 262.6 NG/ML (ref 13–150)
GFR NON-AFRICAN AMERICAN: 52
GLUCOSE BLD-MCNC: 131 MG/DL (ref 74–109)
GLUCOSE URINE: NEGATIVE MG/DL
HCT VFR BLD CALC: 32.2 % (ref 37–47)
HEMOGLOBIN: 10.4 G/DL (ref 12–16)
HYALINE CASTS: 15 /HPF (ref 0–8)
IMMATURE GRANULOCYTES #: 0.1 K/UL
KETONES, URINE: NEGATIVE MG/DL
LEUKOCYTE ESTERASE, URINE: NEGATIVE
LYMPHOCYTES ABSOLUTE: 1.4 K/UL (ref 1.1–4.5)
LYMPHOCYTES RELATIVE PERCENT: 11.9 % (ref 20–40)
LYMPHOCYTES, BODY FLUID: 19 %
MAGNESIUM: 2.2 MG/DL (ref 1.6–2.4)
MCH RBC QN AUTO: 29.9 PG (ref 27–31)
MCHC RBC AUTO-ENTMCNC: 32.3 G/DL (ref 33–37)
MCV RBC AUTO: 92.5 FL (ref 81–99)
MONOCYTE, FLUID: 10 %
MONOCYTES ABSOLUTE: 1.3 K/UL (ref 0–0.9)
MONOCYTES RELATIVE PERCENT: 11.6 % (ref 0–10)
NEUTROPHIL, FLUID: 70 %
NEUTROPHILS ABSOLUTE: 8.7 K/UL (ref 1.5–7.5)
NEUTROPHILS RELATIVE PERCENT: 75.3 % (ref 50–65)
NITRITE, URINE: NEGATIVE
NUCLEATED CELLS FLUID: NORMAL /CUMM
NUMBER OF CELLS COUNTED FLUID: 100
PARATHYROID HORMONE INTACT: 38.4 PG/ML (ref 15–65)
PDW BLD-RTO: 13.8 % (ref 11.5–14.5)
PH UA: 6 (ref 5–8)
PHOSPHORUS: 2.5 MG/DL (ref 2.5–4.5)
PLATELET # BLD: 231 K/UL (ref 130–400)
PMV BLD AUTO: 11.8 FL (ref 9.4–12.3)
POTASSIUM REFLEX MAGNESIUM: 3.4 MMOL/L (ref 3.5–5)
PROTEIN UA: 100 MG/DL
RBC # BLD: 3.48 M/UL (ref 4.2–5.4)
RBC FLUID: 3500 /CUMM
RBC UA: 2 /HPF (ref 0–4)
SODIUM BLD-SCNC: 138 MMOL/L (ref 136–145)
SPECIFIC GRAVITY UA: 1.02 (ref 1–1.03)
URINE REFLEX TO CULTURE: ABNORMAL
UROBILINOGEN, URINE: 0.2 E.U./DL
WBC # BLD: 11.6 K/UL (ref 4.8–10.8)
WBC UA: 5 /HPF (ref 0–5)

## 2019-12-17 PROCEDURE — 89060 EXAM SYNOVIAL FLUID CRYSTALS: CPT

## 2019-12-17 PROCEDURE — 87205 SMEAR GRAM STAIN: CPT

## 2019-12-17 PROCEDURE — 0S9C3ZZ DRAINAGE OF RIGHT KNEE JOINT, PERCUTANEOUS APPROACH: ICD-10-PCS | Performed by: PHYSICIAN ASSISTANT

## 2019-12-17 PROCEDURE — 2500000003 HC RX 250 WO HCPCS: Performed by: INTERNAL MEDICINE

## 2019-12-17 PROCEDURE — 6370000000 HC RX 637 (ALT 250 FOR IP): Performed by: INTERNAL MEDICINE

## 2019-12-17 PROCEDURE — 36415 COLL VENOUS BLD VENIPUNCTURE: CPT

## 2019-12-17 PROCEDURE — 87070 CULTURE OTHR SPECIMN AEROBIC: CPT

## 2019-12-17 PROCEDURE — 87015 SPECIMEN INFECT AGNT CONCNTJ: CPT

## 2019-12-17 PROCEDURE — 80048 BASIC METABOLIC PNL TOTAL CA: CPT

## 2019-12-17 PROCEDURE — 85025 COMPLETE CBC W/AUTO DIFF WBC: CPT

## 2019-12-17 PROCEDURE — 82728 ASSAY OF FERRITIN: CPT

## 2019-12-17 PROCEDURE — 2580000003 HC RX 258: Performed by: INTERNAL MEDICINE

## 2019-12-17 PROCEDURE — 88305 TISSUE EXAM BY PATHOLOGIST: CPT

## 2019-12-17 PROCEDURE — 97161 PT EVAL LOW COMPLEX 20 MIN: CPT

## 2019-12-17 PROCEDURE — 81001 URINALYSIS AUTO W/SCOPE: CPT

## 2019-12-17 PROCEDURE — 1210000000 HC MED SURG R&B

## 2019-12-17 PROCEDURE — 99233 SBSQ HOSP IP/OBS HIGH 50: CPT | Performed by: NEUROLOGICAL SURGERY

## 2019-12-17 PROCEDURE — 51701 INSERT BLADDER CATHETER: CPT

## 2019-12-17 PROCEDURE — 83970 ASSAY OF PARATHORMONE: CPT

## 2019-12-17 PROCEDURE — 73700 CT LOWER EXTREMITY W/O DYE: CPT

## 2019-12-17 PROCEDURE — 83735 ASSAY OF MAGNESIUM: CPT

## 2019-12-17 PROCEDURE — 97530 THERAPEUTIC ACTIVITIES: CPT

## 2019-12-17 PROCEDURE — 89051 BODY FLUID CELL COUNT: CPT

## 2019-12-17 PROCEDURE — 84100 ASSAY OF PHOSPHORUS: CPT

## 2019-12-17 PROCEDURE — 70450 CT HEAD/BRAIN W/O DYE: CPT

## 2019-12-17 PROCEDURE — 88112 CYTOPATH CELL ENHANCE TECH: CPT

## 2019-12-17 PROCEDURE — 87075 CULTR BACTERIA EXCEPT BLOOD: CPT

## 2019-12-17 RX ORDER — SODIUM CHLORIDE 0.9 % (FLUSH) 0.9 %
10 SYRINGE (ML) INJECTION PRN
Status: DISCONTINUED | OUTPATIENT
Start: 2019-12-17 | End: 2019-12-19 | Stop reason: HOSPADM

## 2019-12-17 RX ORDER — SODIUM CHLORIDE 0.9 % (FLUSH) 0.9 %
10 SYRINGE (ML) INJECTION EVERY 12 HOURS SCHEDULED
Status: DISCONTINUED | OUTPATIENT
Start: 2019-12-17 | End: 2019-12-19 | Stop reason: HOSPADM

## 2019-12-17 RX ORDER — OXYCODONE HYDROCHLORIDE AND ACETAMINOPHEN 5; 325 MG/1; MG/1
1 TABLET ORAL EVERY 8 HOURS PRN
Status: DISCONTINUED | OUTPATIENT
Start: 2019-12-17 | End: 2019-12-19 | Stop reason: HOSPADM

## 2019-12-17 RX ADMIN — SODIUM CHLORIDE, PRESERVATIVE FREE 10 ML: 5 INJECTION INTRAVENOUS at 21:47

## 2019-12-17 RX ADMIN — HYDRALAZINE HYDROCHLORIDE 25 MG: 25 TABLET, FILM COATED ORAL at 13:39

## 2019-12-17 RX ADMIN — HYDRALAZINE HYDROCHLORIDE 25 MG: 25 TABLET, FILM COATED ORAL at 21:47

## 2019-12-17 RX ADMIN — SODIUM CHLORIDE, PRESERVATIVE FREE 10 ML: 5 INJECTION INTRAVENOUS at 09:37

## 2019-12-17 RX ADMIN — METOPROLOL TARTRATE 50 MG: 50 TABLET, FILM COATED ORAL at 21:47

## 2019-12-17 RX ADMIN — HYDROCHLOROTHIAZIDE 25 MG: 25 TABLET ORAL at 09:36

## 2019-12-17 RX ADMIN — PANTOPRAZOLE SODIUM 40 MG: 40 TABLET, DELAYED RELEASE ORAL at 05:22

## 2019-12-17 RX ADMIN — METOPROLOL TARTRATE 50 MG: 50 TABLET, FILM COATED ORAL at 08:00

## 2019-12-17 RX ADMIN — POTASSIUM CHLORIDE 40 MEQ: 1500 TABLET, EXTENDED RELEASE ORAL at 05:22

## 2019-12-17 RX ADMIN — LABETALOL 20 MG/4 ML (5 MG/ML) INTRAVENOUS SYRINGE 10 MG: at 11:34

## 2019-12-17 RX ADMIN — HYDRALAZINE HYDROCHLORIDE 25 MG: 25 TABLET, FILM COATED ORAL at 08:00

## 2019-12-17 RX ADMIN — VALSARTAN 320 MG: 160 TABLET, FILM COATED ORAL at 09:36

## 2019-12-17 ASSESSMENT — PAIN SCALES - WONG BAKER
WONGBAKER_NUMERICALRESPONSE: 0

## 2019-12-17 ASSESSMENT — PAIN SCALES - GENERAL
PAINLEVEL_OUTOF10: 0

## 2019-12-18 LAB
ANION GAP SERPL CALCULATED.3IONS-SCNC: 15 MMOL/L (ref 7–19)
BASOPHILS ABSOLUTE: 0.1 K/UL (ref 0–0.2)
BASOPHILS RELATIVE PERCENT: 0.7 % (ref 0–1)
BUN BLDV-MCNC: 45 MG/DL (ref 8–23)
CALCIUM SERPL-MCNC: 9.1 MG/DL (ref 8.8–10.2)
CHLORIDE BLD-SCNC: 101 MMOL/L (ref 98–111)
CO2: 20 MMOL/L (ref 22–29)
CREAT SERPL-MCNC: 0.9 MG/DL (ref 0.5–0.9)
EOSINOPHILS ABSOLUTE: 0 K/UL (ref 0–0.6)
EOSINOPHILS RELATIVE PERCENT: 0.1 % (ref 0–5)
GFR NON-AFRICAN AMERICAN: 59
GLUCOSE BLD-MCNC: 119 MG/DL (ref 74–109)
HCT VFR BLD CALC: 31.9 % (ref 37–47)
HEMOGLOBIN: 10.4 G/DL (ref 12–16)
IMMATURE GRANULOCYTES #: 0 K/UL
LYMPHOCYTES ABSOLUTE: 1.3 K/UL (ref 1.1–4.5)
LYMPHOCYTES RELATIVE PERCENT: 13.3 % (ref 20–40)
MCH RBC QN AUTO: 30.1 PG (ref 27–31)
MCHC RBC AUTO-ENTMCNC: 32.6 G/DL (ref 33–37)
MCV RBC AUTO: 92.5 FL (ref 81–99)
MONOCYTES ABSOLUTE: 1.1 K/UL (ref 0–0.9)
MONOCYTES RELATIVE PERCENT: 11.6 % (ref 0–10)
MRSA CULTURE ONLY: NORMAL
NEUTROPHILS ABSOLUTE: 7.1 K/UL (ref 1.5–7.5)
NEUTROPHILS RELATIVE PERCENT: 74 % (ref 50–65)
PDW BLD-RTO: 14.3 % (ref 11.5–14.5)
PLATELET # BLD: 241 K/UL (ref 130–400)
PMV BLD AUTO: 12.6 FL (ref 9.4–12.3)
POTASSIUM REFLEX MAGNESIUM: 4.3 MMOL/L (ref 3.5–5)
RBC # BLD: 3.45 M/UL (ref 4.2–5.4)
SODIUM BLD-SCNC: 136 MMOL/L (ref 136–145)
WBC # BLD: 9.6 K/UL (ref 4.8–10.8)

## 2019-12-18 PROCEDURE — 2580000003 HC RX 258: Performed by: INTERNAL MEDICINE

## 2019-12-18 PROCEDURE — 51702 INSERT TEMP BLADDER CATH: CPT

## 2019-12-18 PROCEDURE — 97530 THERAPEUTIC ACTIVITIES: CPT

## 2019-12-18 PROCEDURE — 51798 US URINE CAPACITY MEASURE: CPT

## 2019-12-18 PROCEDURE — 36415 COLL VENOUS BLD VENIPUNCTURE: CPT

## 2019-12-18 PROCEDURE — 97165 OT EVAL LOW COMPLEX 30 MIN: CPT

## 2019-12-18 PROCEDURE — 1210000000 HC MED SURG R&B

## 2019-12-18 PROCEDURE — 80048 BASIC METABOLIC PNL TOTAL CA: CPT

## 2019-12-18 PROCEDURE — 99233 SBSQ HOSP IP/OBS HIGH 50: CPT | Performed by: NEUROLOGICAL SURGERY

## 2019-12-18 PROCEDURE — 85025 COMPLETE CBC W/AUTO DIFF WBC: CPT

## 2019-12-18 PROCEDURE — 6370000000 HC RX 637 (ALT 250 FOR IP): Performed by: INTERNAL MEDICINE

## 2019-12-18 RX ADMIN — HYDRALAZINE HYDROCHLORIDE 25 MG: 25 TABLET, FILM COATED ORAL at 21:39

## 2019-12-18 RX ADMIN — HYDRALAZINE HYDROCHLORIDE 25 MG: 25 TABLET, FILM COATED ORAL at 15:03

## 2019-12-18 RX ADMIN — SODIUM CHLORIDE, PRESERVATIVE FREE 10 ML: 5 INJECTION INTRAVENOUS at 08:40

## 2019-12-18 RX ADMIN — SODIUM CHLORIDE, PRESERVATIVE FREE 10 ML: 5 INJECTION INTRAVENOUS at 21:39

## 2019-12-18 RX ADMIN — VALSARTAN 320 MG: 160 TABLET, FILM COATED ORAL at 08:40

## 2019-12-18 RX ADMIN — HYDRALAZINE HYDROCHLORIDE 25 MG: 25 TABLET, FILM COATED ORAL at 08:40

## 2019-12-18 RX ADMIN — METOPROLOL TARTRATE 50 MG: 50 TABLET, FILM COATED ORAL at 21:39

## 2019-12-18 RX ADMIN — PANTOPRAZOLE SODIUM 40 MG: 40 TABLET, DELAYED RELEASE ORAL at 08:40

## 2019-12-18 RX ADMIN — METOPROLOL TARTRATE 50 MG: 50 TABLET, FILM COATED ORAL at 08:40

## 2019-12-18 RX ADMIN — HYDROCHLOROTHIAZIDE 25 MG: 25 TABLET ORAL at 08:40

## 2019-12-18 ASSESSMENT — PAIN DESCRIPTION - LOCATION: LOCATION: SHOULDER

## 2019-12-18 ASSESSMENT — PAIN DESCRIPTION - ORIENTATION: ORIENTATION: RIGHT

## 2019-12-18 ASSESSMENT — PAIN DESCRIPTION - PAIN TYPE: TYPE: ACUTE PAIN

## 2019-12-18 ASSESSMENT — PAIN SCALES - WONG BAKER: WONGBAKER_NUMERICALRESPONSE: 6

## 2019-12-18 ASSESSMENT — PAIN SCALES - GENERAL: PAINLEVEL_OUTOF10: 0

## 2019-12-19 VITALS
DIASTOLIC BLOOD PRESSURE: 65 MMHG | BODY MASS INDEX: 26.37 KG/M2 | RESPIRATION RATE: 20 BRPM | TEMPERATURE: 97.8 F | HEIGHT: 65 IN | WEIGHT: 158.3 LBS | HEART RATE: 80 BPM | SYSTOLIC BLOOD PRESSURE: 128 MMHG | OXYGEN SATURATION: 94 %

## 2019-12-19 LAB
ANION GAP SERPL CALCULATED.3IONS-SCNC: 16 MMOL/L (ref 7–19)
BASOPHILS ABSOLUTE: 0.1 K/UL (ref 0–0.2)
BASOPHILS RELATIVE PERCENT: 0.8 % (ref 0–1)
BUN BLDV-MCNC: 48 MG/DL (ref 8–23)
CALCIUM SERPL-MCNC: 8.7 MG/DL (ref 8.8–10.2)
CHLORIDE BLD-SCNC: 100 MMOL/L (ref 98–111)
CO2: 21 MMOL/L (ref 22–29)
CREAT SERPL-MCNC: 0.8 MG/DL (ref 0.5–0.9)
EOSINOPHILS ABSOLUTE: 0.1 K/UL (ref 0–0.6)
EOSINOPHILS RELATIVE PERCENT: 1 % (ref 0–5)
GFR NON-AFRICAN AMERICAN: >60
GLUCOSE BLD-MCNC: 124 MG/DL (ref 74–109)
HCT VFR BLD CALC: 32.3 % (ref 37–47)
HEMOGLOBIN: 10.5 G/DL (ref 12–16)
IMMATURE GRANULOCYTES #: 0 K/UL
IRON SATURATION: 12 % (ref 14–50)
IRON: 22 UG/DL (ref 37–145)
LYMPHOCYTES ABSOLUTE: 1.5 K/UL (ref 1.1–4.5)
LYMPHOCYTES RELATIVE PERCENT: 15.4 % (ref 20–40)
MCH RBC QN AUTO: 29.9 PG (ref 27–31)
MCHC RBC AUTO-ENTMCNC: 32.5 G/DL (ref 33–37)
MCV RBC AUTO: 92 FL (ref 81–99)
MONOCYTES ABSOLUTE: 1.1 K/UL (ref 0–0.9)
MONOCYTES RELATIVE PERCENT: 11.8 % (ref 0–10)
NEUTROPHILS ABSOLUTE: 6.8 K/UL (ref 1.5–7.5)
NEUTROPHILS RELATIVE PERCENT: 70.7 % (ref 50–65)
PDW BLD-RTO: 14.1 % (ref 11.5–14.5)
PLATELET # BLD: 266 K/UL (ref 130–400)
PMV BLD AUTO: 12.3 FL (ref 9.4–12.3)
POTASSIUM REFLEX MAGNESIUM: 3.8 MMOL/L (ref 3.5–5)
RBC # BLD: 3.51 M/UL (ref 4.2–5.4)
SODIUM BLD-SCNC: 137 MMOL/L (ref 136–145)
TOTAL IRON BINDING CAPACITY: 186 UG/DL (ref 250–400)
WBC # BLD: 9.7 K/UL (ref 4.8–10.8)

## 2019-12-19 PROCEDURE — 80048 BASIC METABOLIC PNL TOTAL CA: CPT

## 2019-12-19 PROCEDURE — 36415 COLL VENOUS BLD VENIPUNCTURE: CPT

## 2019-12-19 PROCEDURE — 97530 THERAPEUTIC ACTIVITIES: CPT

## 2019-12-19 PROCEDURE — 83540 ASSAY OF IRON: CPT

## 2019-12-19 PROCEDURE — 97116 GAIT TRAINING THERAPY: CPT

## 2019-12-19 PROCEDURE — 84466 ASSAY OF TRANSFERRIN: CPT

## 2019-12-19 PROCEDURE — 2580000003 HC RX 258: Performed by: INTERNAL MEDICINE

## 2019-12-19 PROCEDURE — 6370000000 HC RX 637 (ALT 250 FOR IP): Performed by: INTERNAL MEDICINE

## 2019-12-19 PROCEDURE — 85025 COMPLETE CBC W/AUTO DIFF WBC: CPT

## 2019-12-19 PROCEDURE — 99233 SBSQ HOSP IP/OBS HIGH 50: CPT | Performed by: NEUROLOGICAL SURGERY

## 2019-12-19 RX ORDER — TAMSULOSIN HYDROCHLORIDE 0.4 MG/1
0.4 CAPSULE ORAL DAILY
Qty: 30 CAPSULE | Refills: 0 | Status: SHIPPED | OUTPATIENT
Start: 2019-12-19 | End: 2020-01-20

## 2019-12-19 RX ORDER — TAMSULOSIN HYDROCHLORIDE 0.4 MG/1
0.4 CAPSULE ORAL DAILY
Status: DISCONTINUED | OUTPATIENT
Start: 2019-12-19 | End: 2019-12-19 | Stop reason: HOSPADM

## 2019-12-19 RX ORDER — HYDRALAZINE HYDROCHLORIDE 50 MG/1
50 TABLET, FILM COATED ORAL 3 TIMES DAILY
Qty: 90 TABLET | Refills: 0 | Status: SHIPPED | OUTPATIENT
Start: 2019-12-19 | End: 2020-01-18

## 2019-12-19 RX ORDER — HYDRALAZINE HYDROCHLORIDE 50 MG/1
50 TABLET, FILM COATED ORAL 3 TIMES DAILY
Status: DISCONTINUED | OUTPATIENT
Start: 2019-12-19 | End: 2019-12-19 | Stop reason: HOSPADM

## 2019-12-19 RX ORDER — POLYETHYLENE GLYCOL 3350 17 G/17G
17 POWDER, FOR SOLUTION ORAL DAILY
Status: DISCONTINUED | OUTPATIENT
Start: 2019-12-19 | End: 2019-12-19 | Stop reason: HOSPADM

## 2019-12-19 RX ADMIN — SODIUM CHLORIDE, PRESERVATIVE FREE 10 ML: 5 INJECTION INTRAVENOUS at 08:18

## 2019-12-19 RX ADMIN — POLYETHYLENE GLYCOL 3350 17 G: 17 POWDER, FOR SOLUTION ORAL at 15:22

## 2019-12-19 RX ADMIN — HYDRALAZINE HYDROCHLORIDE 50 MG: 50 TABLET, FILM COATED ORAL at 13:09

## 2019-12-19 RX ADMIN — PANTOPRAZOLE SODIUM 40 MG: 40 TABLET, DELAYED RELEASE ORAL at 08:17

## 2019-12-19 RX ADMIN — METOPROLOL TARTRATE 50 MG: 50 TABLET, FILM COATED ORAL at 08:17

## 2019-12-19 RX ADMIN — HYDROCHLOROTHIAZIDE 25 MG: 25 TABLET ORAL at 08:17

## 2019-12-19 RX ADMIN — MAGNESIUM HYDROXIDE 30 ML: 400 SUSPENSION ORAL at 09:35

## 2019-12-19 RX ADMIN — TAMSULOSIN HYDROCHLORIDE 0.4 MG: 0.4 CAPSULE ORAL at 15:22

## 2019-12-19 RX ADMIN — HYDRALAZINE HYDROCHLORIDE 25 MG: 25 TABLET, FILM COATED ORAL at 08:17

## 2019-12-19 RX ADMIN — VALSARTAN 320 MG: 160 TABLET, FILM COATED ORAL at 08:17

## 2019-12-19 ASSESSMENT — PAIN SCALES - GENERAL: PAINLEVEL_OUTOF10: 3

## 2019-12-19 ASSESSMENT — PAIN DESCRIPTION - PAIN TYPE: TYPE: ACUTE PAIN

## 2019-12-20 LAB — TRANSFERRIN: 140 MG/DL (ref 200–400)

## 2019-12-21 ENCOUNTER — APPOINTMENT (OUTPATIENT)
Dept: CT IMAGING | Age: 84
End: 2019-12-21
Payer: MEDICARE

## 2019-12-21 ENCOUNTER — HOSPITAL ENCOUNTER (EMERGENCY)
Age: 84
Discharge: HOME | End: 2019-12-22
Payer: MEDICARE

## 2019-12-21 VITALS
SYSTOLIC BLOOD PRESSURE: 141 MMHG | OXYGEN SATURATION: 95 % | RESPIRATION RATE: 17 BRPM | DIASTOLIC BLOOD PRESSURE: 54 MMHG | TEMPERATURE: 98.7 F | HEART RATE: 64 BPM

## 2019-12-21 DIAGNOSIS — R79.89 ELEVATED SERUM CREATININE: ICD-10-CM

## 2019-12-21 DIAGNOSIS — R14.0 ABDOMINAL DISTENSION (GASEOUS): Primary | ICD-10-CM

## 2019-12-21 LAB
ALBUMIN SERPL-MCNC: 3.1 G/DL (ref 3.5–5.2)
ALP BLD-CCNC: 71 U/L (ref 35–104)
ALT SERPL-CCNC: 23 U/L (ref 5–33)
ANAEROBIC CULTURE: NORMAL
ANION GAP SERPL CALCULATED.3IONS-SCNC: 17 MMOL/L (ref 7–19)
AST SERPL-CCNC: 30 U/L (ref 5–32)
BACTERIA: NEGATIVE /HPF
BASOPHILS ABSOLUTE: 0.1 K/UL (ref 0–0.2)
BASOPHILS RELATIVE PERCENT: 0.8 % (ref 0–1)
BILIRUB SERPL-MCNC: <0.2 MG/DL (ref 0.2–1.2)
BILIRUBIN URINE: NEGATIVE
BLOOD, URINE: NEGATIVE
BODY FLUID CULTURE, STERILE: NORMAL
BUN BLDV-MCNC: 80 MG/DL (ref 8–23)
C-REACTIVE PROTEIN: 13.81 MG/DL (ref 0–0.5)
CALCIUM SERPL-MCNC: 9 MG/DL (ref 8.8–10.2)
CHLORIDE BLD-SCNC: 93 MMOL/L (ref 98–111)
CLARITY: CLEAR
CO2: 23 MMOL/L (ref 22–29)
COLOR: YELLOW
CREAT SERPL-MCNC: 1.4 MG/DL (ref 0.5–0.9)
EOSINOPHILS ABSOLUTE: 0.1 K/UL (ref 0–0.6)
EOSINOPHILS RELATIVE PERCENT: 0.5 % (ref 0–5)
EPITHELIAL CELLS, UA: 4 /HPF (ref 0–5)
GFR NON-AFRICAN AMERICAN: 35
GLUCOSE BLD-MCNC: 136 MG/DL (ref 74–109)
GLUCOSE URINE: NEGATIVE MG/DL
GRAM STAIN RESULT: NORMAL
HCT VFR BLD CALC: 32.7 % (ref 37–47)
HEMOGLOBIN: 10.4 G/DL (ref 12–16)
HYALINE CASTS: 8 /HPF (ref 0–8)
IMMATURE GRANULOCYTES #: 0.1 K/UL
INR BLD: 1.21 (ref 0.88–1.18)
KETONES, URINE: NEGATIVE MG/DL
LACTIC ACID: 0.9 MMOL/L (ref 0.5–1.9)
LEUKOCYTE ESTERASE, URINE: ABNORMAL
LYMPHOCYTES ABSOLUTE: 1.4 K/UL (ref 1.1–4.5)
LYMPHOCYTES RELATIVE PERCENT: 12.2 % (ref 20–40)
MCH RBC QN AUTO: 29.6 PG (ref 27–31)
MCHC RBC AUTO-ENTMCNC: 31.8 G/DL (ref 33–37)
MCV RBC AUTO: 93.2 FL (ref 81–99)
MONOCYTES ABSOLUTE: 1.4 K/UL (ref 0–0.9)
MONOCYTES RELATIVE PERCENT: 12.2 % (ref 0–10)
NEUTROPHILS ABSOLUTE: 8.3 K/UL (ref 1.5–7.5)
NEUTROPHILS RELATIVE PERCENT: 73.9 % (ref 50–65)
NITRITE, URINE: NEGATIVE
PDW BLD-RTO: 14.1 % (ref 11.5–14.5)
PH UA: 5.5 (ref 5–8)
PLATELET # BLD: 293 K/UL (ref 130–400)
PMV BLD AUTO: 13 FL (ref 9.4–12.3)
POTASSIUM SERPL-SCNC: 3.6 MMOL/L (ref 3.5–5)
PROTEIN UA: ABNORMAL MG/DL
PROTHROMBIN TIME: 14.7 SEC (ref 12–14.6)
RBC # BLD: 3.51 M/UL (ref 4.2–5.4)
RBC UA: 1 /HPF (ref 0–4)
SEDIMENTATION RATE, ERYTHROCYTE: 130 MM/HR (ref 0–25)
SODIUM BLD-SCNC: 133 MMOL/L (ref 136–145)
SPECIFIC GRAVITY UA: 1.02 (ref 1–1.03)
TOTAL PROTEIN: 7.1 G/DL (ref 6.6–8.7)
URINE REFLEX TO CULTURE: YES
UROBILINOGEN, URINE: 0.2 E.U./DL
WBC # BLD: 11.3 K/UL (ref 4.8–10.8)
WBC UA: 8 /HPF (ref 0–5)

## 2019-12-21 PROCEDURE — 83605 ASSAY OF LACTIC ACID: CPT

## 2019-12-21 PROCEDURE — 86140 C-REACTIVE PROTEIN: CPT

## 2019-12-21 PROCEDURE — 85610 PROTHROMBIN TIME: CPT

## 2019-12-21 PROCEDURE — 85652 RBC SED RATE AUTOMATED: CPT

## 2019-12-21 PROCEDURE — 36415 COLL VENOUS BLD VENIPUNCTURE: CPT

## 2019-12-21 PROCEDURE — 2580000003 HC RX 258: Performed by: NURSE PRACTITIONER

## 2019-12-21 PROCEDURE — 80053 COMPREHEN METABOLIC PANEL: CPT

## 2019-12-21 PROCEDURE — 87086 URINE CULTURE/COLONY COUNT: CPT

## 2019-12-21 PROCEDURE — 74176 CT ABD & PELVIS W/O CONTRAST: CPT

## 2019-12-21 PROCEDURE — 85025 COMPLETE CBC W/AUTO DIFF WBC: CPT

## 2019-12-21 PROCEDURE — 99284 EMERGENCY DEPT VISIT MOD MDM: CPT

## 2019-12-21 PROCEDURE — 81001 URINALYSIS AUTO W/SCOPE: CPT

## 2019-12-21 RX ORDER — 0.9 % SODIUM CHLORIDE 0.9 %
500 INTRAVENOUS SOLUTION INTRAVENOUS ONCE
Status: COMPLETED | OUTPATIENT
Start: 2019-12-21 | End: 2019-12-21

## 2019-12-21 RX ADMIN — SODIUM CHLORIDE 500 ML: 9 INJECTION, SOLUTION INTRAVENOUS at 19:19

## 2019-12-21 ASSESSMENT — ENCOUNTER SYMPTOMS
VOMITING: 0
CONSTIPATION: 0
BACK PAIN: 0
DIARRHEA: 0
BLOATING: 1
ABDOMINAL PAIN: 0

## 2019-12-23 LAB — URINE CULTURE, ROUTINE: NORMAL

## 2020-01-06 ENCOUNTER — TELEPHONE (OUTPATIENT)
Dept: NEUROSURGERY | Age: 85
End: 2020-01-06

## 2020-01-15 ENCOUNTER — OFFICE VISIT (OUTPATIENT)
Dept: UROLOGY | Age: 85
End: 2020-01-15
Payer: MEDICARE

## 2020-01-15 VITALS — HEIGHT: 64 IN | TEMPERATURE: 98 F | WEIGHT: 163 LBS | BODY MASS INDEX: 27.83 KG/M2

## 2020-01-15 PROCEDURE — 99202 OFFICE O/P NEW SF 15 MIN: CPT | Performed by: PHYSICIAN ASSISTANT

## 2020-01-15 PROCEDURE — 51798 US URINE CAPACITY MEASURE: CPT | Performed by: PHYSICIAN ASSISTANT

## 2020-01-15 PROCEDURE — G8417 CALC BMI ABV UP PARAM F/U: HCPCS | Performed by: PHYSICIAN ASSISTANT

## 2020-01-15 PROCEDURE — 1123F ACP DISCUSS/DSCN MKR DOCD: CPT | Performed by: PHYSICIAN ASSISTANT

## 2020-01-15 PROCEDURE — 4040F PNEUMOC VAC/ADMIN/RCVD: CPT | Performed by: PHYSICIAN ASSISTANT

## 2020-01-15 PROCEDURE — 1111F DSCHRG MED/CURRENT MED MERGE: CPT | Performed by: PHYSICIAN ASSISTANT

## 2020-01-15 PROCEDURE — 1090F PRES/ABSN URINE INCON ASSESS: CPT | Performed by: PHYSICIAN ASSISTANT

## 2020-01-15 PROCEDURE — G8427 DOCREV CUR MEDS BY ELIG CLIN: HCPCS | Performed by: PHYSICIAN ASSISTANT

## 2020-01-15 PROCEDURE — G8484 FLU IMMUNIZE NO ADMIN: HCPCS | Performed by: PHYSICIAN ASSISTANT

## 2020-01-15 PROCEDURE — 1036F TOBACCO NON-USER: CPT | Performed by: PHYSICIAN ASSISTANT

## 2020-01-15 RX ORDER — SACCHAROMYCES BOULARDII 250 MG
250 CAPSULE ORAL 2 TIMES DAILY
COMMUNITY

## 2020-01-15 ASSESSMENT — ENCOUNTER SYMPTOMS
CONSTIPATION: 0
DIARRHEA: 0
EYE DISCHARGE: 0
ABDOMINAL PAIN: 0
EYE REDNESS: 0
WHEEZING: 0
COUGH: 0
SHORTNESS OF BREATH: 0
BACK PAIN: 0

## 2020-01-15 NOTE — PROGRESS NOTES
Meena Mohan is a 80 y.o. female who presents today   Chief Complaint   Patient presents with    New Patient     I am here for urinary retention. The nursing home is cathing me twice a day every 12 hours. Retention:  Patient is an 77-year-old female who presents today for evaluation of retention. Patient has history of right ganglionic hemorrhage. This past month since she has been at [facility] she is had inability to empty her bladder she is currently on a twice daily in and out catheterization regimen. Patient prior to recent hospitalization states she did not have problems urinating that she was aware of. Patient denies any urinary tract infection symptoms she states she is not aware of her bladder being full in between catheterizations.      Past Medical History:   Diagnosis Date    Hypertension        Past Surgical History:   Procedure Laterality Date    FINGER SURGERY Right 5/3/2019    PERCUTANEOUS PINNING RIGHT THUMB DISTAL PHALANX FRACTURE performed by Marissa Paredes MD at [address], PARTIAL Left     TONSILLECTOMY      WRIST FRACTURE SURGERY Right 5/3/2019    OPEN REDUCTION INTERNAL FIXATION RIGHT DISTAL RADIUS performed by Marissa Paredes MD at [facility] OR       Current Outpatient Medications   Medication Sig Dispense Refill    saccharomyces boulardii (FLORASTOR) 250 MG capsule Take 250 mg by mouth 2 times daily      hydrALAZINE (APRESOLINE) 50 MG tablet Take 1 tablet by mouth 3 times daily 90 tablet 0    tamsulosin (FLOMAX) 0.4 MG capsule Take 1 capsule by mouth daily 30 capsule 0    Loteprednol Etabonate (LOTEMAX) 0.5 % OINT Apply to eye 2 times daily Apply thin layer to affected eye BID      metoprolol tartrate (LOPRESSOR) 50 MG tablet Take 50 mg by mouth 2 times daily      valsartan-hydrochlorothiazide (DIOVAN-HCT) 320-25 MG per tablet Take 1 tablet by mouth daily      omeprazole (PRILOSEC) 40 MG delayed release capsule Take 40 mg by mouth daily      Eyes: Negative for discharge and redness. Respiratory: Negative for cough, shortness of breath and wheezing. Cardiovascular: Negative for leg swelling. Gastrointestinal: Negative for abdominal pain, constipation and diarrhea. Endocrine: Negative for cold intolerance and heat intolerance. Genitourinary: Negative for decreased urine volume, difficulty urinating, dysuria, enuresis, flank pain, frequency, genital sores, hematuria and urgency. Musculoskeletal: Negative for back pain and gait problem. Skin: Negative for rash. Allergic/Immunologic: Negative for environmental allergies. Neurological: Negative for dizziness, weakness and headaches. Hematological: Does not bruise/bleed easily. Psychiatric/Behavioral: Negative for behavioral problems, confusion and sleep disturbance. PHYSICAL EXAM:  Temp 98 °F (36.7 °C) (Temporal)   Ht 5' 4\" (1.626 m)   Wt 163 lb (73.9 kg)   BMI 27.98 kg/m²   Physical Exam  HENT:      Head: Normocephalic. Ears:      Comments: Patient has hearing aids however she does have difficulty understanding but does understand when I raise my voice. Nose: Nose normal.   Eyes:      Conjunctiva/sclera: Conjunctivae normal.   Pulmonary:      Effort: Pulmonary effort is normal.   Abdominal:      General: There is no distension. Palpations: Abdomen is soft. Tenderness: There is no tenderness. Musculoskeletal:      Comments: Patient in wheelchair however she can stand with assistance. Skin:     General: Skin is warm and dry. Neurological:      Mental Status: She is oriented to person, place, and time. Psychiatric:         Mood and Affect: Mood normal.         Behavior: Behavior normal.                     Imaging:  Bladder scan 106 mL she was cathed this a.m. 1. 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   Patient is requiring in and out catheterization she has not done twice a day and I recommend she have this done 3 times a day she is at [facility]. She does not have much sensation when her bladder is full in between catheterizations. I told her this could be a permanent problem or could improve over time but there is nothing urologic to do except for in and out catheterization I do not recommend an indwelling catheter due to the increased risk of catheter acquired urinary tract infections. Patient will follow-up in 1 month. - MO MEASUREMENT,POST-VOID RESIDUAL VOLUME BY US,NON-IMAGING      Orders Placed This Encounter   Procedures    MO MEASUREMENT,POST-VOID RESIDUAL VOLUME BY US,NON-IMAGING     No orders of the defined types were placed in this encounter. Plan:  Follow-up in 1 month.

## 2020-01-20 ENCOUNTER — OFFICE VISIT (OUTPATIENT)
Dept: NEUROSURGERY | Age: 85
End: 2020-01-20
Payer: MEDICARE

## 2020-01-20 ENCOUNTER — HOSPITAL ENCOUNTER (OUTPATIENT)
Dept: CT IMAGING | Age: 85
Discharge: HOME | End: 2020-01-20
Payer: MEDICARE

## 2020-01-20 VITALS
HEART RATE: 78 BPM | WEIGHT: 159 LBS | HEIGHT: 67 IN | SYSTOLIC BLOOD PRESSURE: 163 MMHG | DIASTOLIC BLOOD PRESSURE: 72 MMHG | BODY MASS INDEX: 24.96 KG/M2

## 2020-01-20 PROBLEM — I62.9 ACUTE INTRA-CRANIAL HEMORRHAGE (HCC): Status: RESOLVED | Noted: 2019-12-16 | Resolved: 2020-01-20

## 2020-01-20 PROBLEM — I61.0 BASAL GANGLIA HEMORRHAGE (HCC): Status: ACTIVE | Noted: 2020-01-20

## 2020-01-20 PROCEDURE — 4040F PNEUMOC VAC/ADMIN/RCVD: CPT | Performed by: NEUROLOGICAL SURGERY

## 2020-01-20 PROCEDURE — 1036F TOBACCO NON-USER: CPT | Performed by: NEUROLOGICAL SURGERY

## 2020-01-20 PROCEDURE — 99213 OFFICE O/P EST LOW 20 MIN: CPT | Performed by: NEUROLOGICAL SURGERY

## 2020-01-20 PROCEDURE — G8484 FLU IMMUNIZE NO ADMIN: HCPCS | Performed by: NEUROLOGICAL SURGERY

## 2020-01-20 PROCEDURE — G8427 DOCREV CUR MEDS BY ELIG CLIN: HCPCS | Performed by: NEUROLOGICAL SURGERY

## 2020-01-20 PROCEDURE — 1123F ACP DISCUSS/DSCN MKR DOCD: CPT | Performed by: NEUROLOGICAL SURGERY

## 2020-01-20 PROCEDURE — G8420 CALC BMI NORM PARAMETERS: HCPCS | Performed by: NEUROLOGICAL SURGERY

## 2020-01-20 PROCEDURE — 1090F PRES/ABSN URINE INCON ASSESS: CPT | Performed by: NEUROLOGICAL SURGERY

## 2020-01-20 PROCEDURE — 70450 CT HEAD/BRAIN W/O DYE: CPT

## 2020-01-20 RX ORDER — VALSARTAN 320 MG/1
320 TABLET ORAL DAILY
COMMUNITY

## 2020-01-20 RX ORDER — HYDRALAZINE HYDROCHLORIDE 25 MG/1
1 TABLET, FILM COATED ORAL 3 TIMES DAILY
COMMUNITY

## 2020-01-20 RX ORDER — VALSARTAN AND HYDROCHLOROTHIAZIDE 320; 25 MG/1; MG/1
1 TABLET, FILM COATED ORAL DAILY
COMMUNITY

## 2020-01-20 NOTE — PROGRESS NOTES
HENT: Negative. Eyes: Negative. Respiratory: Negative. Cardiovascular: Negative. Gastrointestinal: Negative. Genitourinary: Negative. Musculoskeletal: Negative. Skin: Negative. Neurological: Positive for headaches. Endo/Heme/Allergies: Negative. Psychiatric/Behavioral: Negative. ROS was obtained by the medical assistant and reviewed by myself    Objective:    BP (!) 163/72   Pulse 78   Ht 5' 7\" (1.702 m)   Wt 159 lb (72.1 kg)   Breastfeeding No   BMI 24.90 kg/m²     No intake or output data in the 24 hours ending 01/20/20 1102        Physical Exam:    General: alert, cooperative, no distress  Cardiorespiratory: unlabored breathing  Abdomen: soft and nondistended      Neurologic Exam:    Mental Status: Alert, appropriate, hard of hearing  Cranial Nerves: PERRL, EOMI, symmetric facies, tongue midline  Motor: 5/5 RUE, 5/5 RLE, 5/5 LUE/LLE  Somatosensory: normal light touch sensation        Imaging:    Repeat CT performed today shows interval resolution of the hematoma in the L basal ganglia. Assessment and Plan:  79 y/o F transferred from [facility] ED on 12/16 for further management of a right ganglionic hemorrhage, likely hypertensive in etiology. She has been doing well since hospital discharge with the exception of in/out catheterization for neurogenic bladder and treatment for C. Diff colitis.   She has returned to her neurologic baseline otherwise and her repeat CT shows interval resolution of her hemorrhage.    -Patient is doing well from neurosurgical perspective  -Continue to increase activity with PT/OT  -Recommend continued strict BP control, systolic <013 mmHg  -OK to resume baby ASA at this point  -No need for additional imaging or neurosurgical follow up at this point, unless there is a neurologic change    Electronically signed by Shawnee Garvey MD on 1/20/2020 at 11:02 AM

## 2020-02-10 ENCOUNTER — TELEPHONE (OUTPATIENT)
Dept: UROLOGY | Age: 85
End: 2020-02-10

## 2020-10-23 ENCOUNTER — TELEPHONE (OUTPATIENT)
Dept: NEUROSURGERY | Age: 85
End: 2020-10-23

## 2020-10-23 NOTE — TELEPHONE ENCOUNTER
[facility] New Patient Questionnaire    1. Diagnosis/Reason for Referral? COMPRESSION FRACTURE        2. Who is completing questionnaire? Patient   Caregiver Family X      3. Has the patient had any previous spinal/brain surgeries? NO        A. If yes, what is the name of the facility in which the surgery was performed? B. Procedure/Surgery performed? C. Who was the surgeon? D. When was the surgery? MM/YY       E. Did the patient improve after the surgery? 4. Is this a second opinion? If yes, Dr. Deysi Salcedo would like to review patient first before making the appointment. 5. Have MRI Images been obtain within the last year? Yes X No      XR  CT     If yes, where was the imaging performed? [facility]      If yes, what part of the body? Lumbar X Cervical  Thoracic  Brain     If yes, when was it obtained? 10/20    Note: if the scan was performed at a facility other than [facility], the disc will need to be brought to the appointment or we need to reach out to obtain the disc. A. Was the patient instructed to provide the disc? Yes  X No      8. Has the patient had a NCV/EMG within the last year? Yes  No x     If yes, where was it performed and date? Location:      9. Has the patient been to Physical Therapy? Yes x No     If yes, what location, how long attended, and last visit? Location:        Therapy Lasted: 3 months   Date of Last Visit:      10. Has the patient been to Pain Management?      Yes x No     If yes, what location and last visit     Location: [facility]   Last Visit: 9-20   Is it helping? no

## (undated) DEVICE — GLOVE SURG SZ 75 L12IN FNGR THK94MIL TRNSLUC YEL LTX

## (undated) DEVICE — Device

## (undated) DEVICE — BIT DRL L110MM DIA1.8MM QUIK CPL CALIB W/O STP REUSE

## (undated) DEVICE — SOLUTION IV IRRIG POUR BRL 0.9% SODIUM CHL 2F7124

## (undated) DEVICE — SUTURE ETHLN SZ 3-0 L18IN NONABSORBABLE BLK FS-1 L24MM 3/8 663H

## (undated) DEVICE — SURGICAL PROCEDURE PACK LOWER EXTREMITY LOURDES HOSP

## (undated) DEVICE — ZIMMER® STERILE DISPOSABLE TOURNIQUET CUFF WITH PLC, DUAL PORT, SINGLE BLADDER, 18 IN. (46 CM)

## (undated) DEVICE — BANDAGE COMPR W4INXL15FT BGE E SGL LAYERED CLP CLSR

## (undated) DEVICE — DRESSING PETRO W3XL8IN OIL EMUL N ADH GZ KNIT IMPREG CELOS

## (undated) DEVICE — SUTURE VCRL SZ 2-0 L27IN ABSRB UD L26MM SH 1/2 CIR J417H

## (undated) DEVICE — BLADE SURG NO15 C STL DISPOSABLE ST

## (undated) DEVICE — K WIRE FIX L150MM DIA1.25MM S STL TRCR PNT
Type: IMPLANTABLE DEVICE | Site: WRIST | Status: NON-FUNCTIONAL
Removed: 2019-05-03

## (undated) DEVICE — C-ARM: Brand: UNBRANDED